# Patient Record
Sex: FEMALE | Race: WHITE | NOT HISPANIC OR LATINO | URBAN - METROPOLITAN AREA
[De-identification: names, ages, dates, MRNs, and addresses within clinical notes are randomized per-mention and may not be internally consistent; named-entity substitution may affect disease eponyms.]

---

## 2017-02-18 ENCOUNTER — OUTPATIENT (OUTPATIENT)
Dept: OUTPATIENT SERVICES | Facility: HOSPITAL | Age: 77
LOS: 1 days | End: 2017-02-18
Payer: MEDICARE

## 2017-02-18 ENCOUNTER — APPOINTMENT (OUTPATIENT)
Dept: MRI IMAGING | Facility: IMAGING CENTER | Age: 77
End: 2017-02-18

## 2017-02-18 DIAGNOSIS — Z00.8 ENCOUNTER FOR OTHER GENERAL EXAMINATION: ICD-10-CM

## 2017-02-18 PROCEDURE — 82565 ASSAY OF CREATININE: CPT

## 2017-02-18 PROCEDURE — A9585: CPT

## 2017-02-18 PROCEDURE — 72197 MRI PELVIS W/O & W/DYE: CPT

## 2017-02-18 PROCEDURE — 74183 MRI ABD W/O CNTR FLWD CNTR: CPT

## 2017-06-21 ENCOUNTER — FORM ENCOUNTER (OUTPATIENT)
Age: 77
End: 2017-06-21

## 2017-06-22 ENCOUNTER — APPOINTMENT (OUTPATIENT)
Dept: RHEUMATOLOGY | Facility: CLINIC | Age: 77
End: 2017-06-22

## 2017-06-22 ENCOUNTER — APPOINTMENT (OUTPATIENT)
Dept: RHEUMATOLOGY | Facility: CLINIC | Age: 77
End: 2017-06-22
Payer: MEDICARE

## 2017-06-22 VITALS
SYSTOLIC BLOOD PRESSURE: 114 MMHG | HEART RATE: 79 BPM | BODY MASS INDEX: 33.39 KG/M2 | DIASTOLIC BLOOD PRESSURE: 79 MMHG | TEMPERATURE: 98 F | WEIGHT: 198 LBS | OXYGEN SATURATION: 97 % | HEIGHT: 64.5 IN

## 2017-06-22 DIAGNOSIS — I48.91 UNSPECIFIED ATRIAL FIBRILLATION: ICD-10-CM

## 2017-06-22 PROCEDURE — 73130 X-RAY EXAM OF HAND: CPT | Mod: LT

## 2017-06-22 RX ORDER — DIAZEPAM 5 MG/1
5 TABLET ORAL
Refills: 0 | Status: ACTIVE | COMMUNITY

## 2017-06-22 RX ORDER — APIXABAN 5 MG/1
5 TABLET, FILM COATED ORAL
Refills: 0 | Status: ACTIVE | COMMUNITY

## 2017-06-22 RX ORDER — BUTALBITAL, ACETAMINOPHEN AND CAFFEINE 50; 325; 40 MG/1; MG/1; MG/1
50-325-40 CAPSULE ORAL
Refills: 0 | Status: ACTIVE | COMMUNITY

## 2017-06-23 LAB
25(OH)D3 SERPL-MCNC: 32 NG/ML
ALBUMIN SERPL ELPH-MCNC: 4 G/DL
ALP BLD-CCNC: 69 U/L
ALT SERPL-CCNC: 8 U/L
ANION GAP SERPL CALC-SCNC: 18 MMOL/L
AST SERPL-CCNC: 15 U/L
BASOPHILS # BLD AUTO: 0.02 K/UL
BASOPHILS NFR BLD AUTO: 0.2 %
BILIRUB SERPL-MCNC: 0.3 MG/DL
BUN SERPL-MCNC: 13 MG/DL
CALCIUM SERPL-MCNC: 9.5 MG/DL
CHLORIDE SERPL-SCNC: 99 MMOL/L
CO2 SERPL-SCNC: 26 MMOL/L
CREAT SERPL-MCNC: 0.77 MG/DL
CRP SERPL-MCNC: 1.7 MG/DL
EOSINOPHIL # BLD AUTO: 0.13 K/UL
EOSINOPHIL NFR BLD AUTO: 1.4 %
ERYTHROCYTE [SEDIMENTATION RATE] IN BLOOD BY WESTERGREN METHOD: 25 MM/HR
GLUCOSE SERPL-MCNC: 82 MG/DL
HCT VFR BLD CALC: 42.3 %
HGB BLD-MCNC: 14.1 G/DL
IMM GRANULOCYTES NFR BLD AUTO: 0.2 %
LYMPHOCYTES # BLD AUTO: 3.21 K/UL
LYMPHOCYTES NFR BLD AUTO: 35.2 %
MAN DIFF?: NORMAL
MCHC RBC-ENTMCNC: 29.4 PG
MCHC RBC-ENTMCNC: 33.3 GM/DL
MCV RBC AUTO: 88.1 FL
MONOCYTES # BLD AUTO: 0.6 K/UL
MONOCYTES NFR BLD AUTO: 6.6 %
NEUTROPHILS # BLD AUTO: 5.15 K/UL
NEUTROPHILS NFR BLD AUTO: 56.4 %
PLATELET # BLD AUTO: 266 K/UL
POTASSIUM SERPL-SCNC: 4.3 MMOL/L
PROT SERPL-MCNC: 7 G/DL
RBC # BLD: 4.8 M/UL
RBC # FLD: 14.1 %
SODIUM SERPL-SCNC: 143 MMOL/L
WBC # FLD AUTO: 9.13 K/UL

## 2017-06-26 LAB — G6PD SER-CCNC: 9.4 U/G HB

## 2017-08-09 ENCOUNTER — FORM ENCOUNTER (OUTPATIENT)
Age: 77
End: 2017-08-09

## 2017-08-10 ENCOUNTER — OUTPATIENT (OUTPATIENT)
Dept: OUTPATIENT SERVICES | Facility: HOSPITAL | Age: 77
LOS: 1 days | End: 2017-08-10
Payer: MEDICARE

## 2017-08-10 ENCOUNTER — APPOINTMENT (OUTPATIENT)
Dept: RHEUMATOLOGY | Facility: CLINIC | Age: 77
End: 2017-08-10
Payer: MEDICARE

## 2017-08-10 ENCOUNTER — APPOINTMENT (OUTPATIENT)
Dept: ULTRASOUND IMAGING | Facility: CLINIC | Age: 77
End: 2017-08-10
Payer: MEDICARE

## 2017-08-10 ENCOUNTER — APPOINTMENT (OUTPATIENT)
Dept: RADIOLOGY | Facility: CLINIC | Age: 77
End: 2017-08-10
Payer: MEDICARE

## 2017-08-10 VITALS
HEART RATE: 101 BPM | WEIGHT: 197 LBS | DIASTOLIC BLOOD PRESSURE: 77 MMHG | SYSTOLIC BLOOD PRESSURE: 117 MMHG | TEMPERATURE: 98.7 F | HEIGHT: 64.5 IN | OXYGEN SATURATION: 97 % | BODY MASS INDEX: 33.22 KG/M2

## 2017-08-10 DIAGNOSIS — M79.671 PAIN IN RIGHT FOOT: ICD-10-CM

## 2017-08-10 DIAGNOSIS — Z48.89 ENCOUNTER FOR OTHER SPECIFIED SURGICAL AFTERCARE: ICD-10-CM

## 2017-08-10 PROCEDURE — 73630 X-RAY EXAM OF FOOT: CPT | Mod: 26,RT

## 2017-08-10 PROCEDURE — 73610 X-RAY EXAM OF ANKLE: CPT

## 2017-08-10 PROCEDURE — 99214 OFFICE O/P EST MOD 30 MIN: CPT

## 2017-08-10 PROCEDURE — 73610 X-RAY EXAM OF ANKLE: CPT | Mod: 26,RT

## 2017-08-10 PROCEDURE — 93970 EXTREMITY STUDY: CPT

## 2017-08-10 PROCEDURE — 73630 X-RAY EXAM OF FOOT: CPT

## 2017-08-10 PROCEDURE — 93970 EXTREMITY STUDY: CPT | Mod: 26

## 2017-08-14 LAB
25(OH)D3 SERPL-MCNC: 33.5 NG/ML
ALBUMIN SERPL ELPH-MCNC: 4 G/DL
ALP BLD-CCNC: 82 U/L
ALT SERPL-CCNC: 12 U/L
ANION GAP SERPL CALC-SCNC: 17 MMOL/L
AST SERPL-CCNC: 14 U/L
BASOPHILS # BLD AUTO: 0.03 K/UL
BASOPHILS NFR BLD AUTO: 0.3 %
BILIRUB SERPL-MCNC: 0.6 MG/DL
BUN SERPL-MCNC: 12 MG/DL
CALCIUM SERPL-MCNC: 9.4 MG/DL
CHLORIDE SERPL-SCNC: 100 MMOL/L
CO2 SERPL-SCNC: 24 MMOL/L
CREAT SERPL-MCNC: 0.79 MG/DL
CRP SERPL-MCNC: 4.9 MG/DL
EOSINOPHIL # BLD AUTO: 0.13 K/UL
EOSINOPHIL NFR BLD AUTO: 1.2 %
ERYTHROCYTE [SEDIMENTATION RATE] IN BLOOD BY WESTERGREN METHOD: 23 MM/HR
GLUCOSE SERPL-MCNC: 105 MG/DL
HCT VFR BLD CALC: 43.3 %
HGB BLD-MCNC: 14.2 G/DL
IMM GRANULOCYTES NFR BLD AUTO: 0.3 %
LYMPHOCYTES # BLD AUTO: 2.93 K/UL
LYMPHOCYTES NFR BLD AUTO: 27.6 %
MAGNESIUM SERPL-MCNC: 1.9 MG/DL
MAN DIFF?: NORMAL
MCHC RBC-ENTMCNC: 29.1 PG
MCHC RBC-ENTMCNC: 32.8 GM/DL
MCV RBC AUTO: 88.7 FL
MONOCYTES # BLD AUTO: 0.84 K/UL
MONOCYTES NFR BLD AUTO: 7.9 %
NEUTROPHILS # BLD AUTO: 6.66 K/UL
NEUTROPHILS NFR BLD AUTO: 62.7 %
PHOSPHATE SERPL-MCNC: 3.5 MG/DL
PLATELET # BLD AUTO: 273 K/UL
POTASSIUM SERPL-SCNC: 3.9 MMOL/L
PROT SERPL-MCNC: 6.9 G/DL
RBC # BLD: 4.88 M/UL
RBC # FLD: 14.2 %
SODIUM SERPL-SCNC: 141 MMOL/L
URATE SERPL-MCNC: 8 MG/DL
WBC # FLD AUTO: 10.62 K/UL

## 2017-10-19 ENCOUNTER — APPOINTMENT (OUTPATIENT)
Dept: RHEUMATOLOGY | Facility: CLINIC | Age: 77
End: 2017-10-19
Payer: MEDICARE

## 2017-10-19 VITALS
DIASTOLIC BLOOD PRESSURE: 84 MMHG | HEART RATE: 85 BPM | HEIGHT: 64.5 IN | SYSTOLIC BLOOD PRESSURE: 127 MMHG | WEIGHT: 195 LBS | TEMPERATURE: 98.5 F | OXYGEN SATURATION: 98 % | BODY MASS INDEX: 32.89 KG/M2

## 2017-10-19 DIAGNOSIS — K42.9 UMBILICAL HERNIA W/OUT OBSTRUCTION OR GANGRENE: ICD-10-CM

## 2017-10-19 DIAGNOSIS — M79.671 PAIN IN RIGHT FOOT: ICD-10-CM

## 2017-10-19 DIAGNOSIS — Z09 ENCOUNTER FOR FOLLOW-UP EXAMINATION AFTER COMPLETED TREATMENT FOR CONDITIONS OTHER THAN MALIGNANT NEOPLASM: ICD-10-CM

## 2017-10-19 PROCEDURE — 99215 OFFICE O/P EST HI 40 MIN: CPT

## 2017-10-20 LAB
25(OH)D3 SERPL-MCNC: 36.5 NG/ML
ALBUMIN SERPL ELPH-MCNC: 3.8 G/DL
ALP BLD-CCNC: 76 U/L
ALT SERPL-CCNC: 14 U/L
ANION GAP SERPL CALC-SCNC: 13 MMOL/L
AST SERPL-CCNC: 18 U/L
BASOPHILS # BLD AUTO: 0.02 K/UL
BASOPHILS NFR BLD AUTO: 0.2 %
BILIRUB SERPL-MCNC: 0.4 MG/DL
BUN SERPL-MCNC: 13 MG/DL
CALCIUM SERPL-MCNC: 9.9 MG/DL
CHLORIDE SERPL-SCNC: 102 MMOL/L
CO2 SERPL-SCNC: 27 MMOL/L
CREAT SERPL-MCNC: 0.85 MG/DL
CRP SERPL-MCNC: 1.9 MG/DL
EOSINOPHIL # BLD AUTO: 0.08 K/UL
EOSINOPHIL NFR BLD AUTO: 0.9 %
ERYTHROCYTE [SEDIMENTATION RATE] IN BLOOD BY WESTERGREN METHOD: 28 MM/HR
GLUCOSE SERPL-MCNC: 92 MG/DL
HCT VFR BLD CALC: 42.1 %
HGB BLD-MCNC: 13.7 G/DL
IMM GRANULOCYTES NFR BLD AUTO: 0.2 %
LYMPHOCYTES # BLD AUTO: 2.81 K/UL
LYMPHOCYTES NFR BLD AUTO: 30.5 %
MAN DIFF?: NORMAL
MCHC RBC-ENTMCNC: 29 PG
MCHC RBC-ENTMCNC: 32.5 GM/DL
MCV RBC AUTO: 89 FL
MONOCYTES # BLD AUTO: 0.71 K/UL
MONOCYTES NFR BLD AUTO: 7.7 %
NEUTROPHILS # BLD AUTO: 5.58 K/UL
NEUTROPHILS NFR BLD AUTO: 60.5 %
PLATELET # BLD AUTO: 258 K/UL
POTASSIUM SERPL-SCNC: 4 MMOL/L
PROT SERPL-MCNC: 6.7 G/DL
RBC # BLD: 4.73 M/UL
RBC # FLD: 13.9 %
SODIUM SERPL-SCNC: 142 MMOL/L
TSH SERPL-ACNC: 1.35 UIU/ML
URATE SERPL-MCNC: 8 MG/DL
WBC # FLD AUTO: 9.22 K/UL

## 2018-01-22 ENCOUNTER — FORM ENCOUNTER (OUTPATIENT)
Age: 78
End: 2018-01-22

## 2018-01-23 ENCOUNTER — APPOINTMENT (OUTPATIENT)
Dept: MAMMOGRAPHY | Facility: IMAGING CENTER | Age: 78
End: 2018-01-23
Payer: MEDICARE

## 2018-01-23 ENCOUNTER — OUTPATIENT (OUTPATIENT)
Dept: OUTPATIENT SERVICES | Facility: HOSPITAL | Age: 78
LOS: 1 days | End: 2018-01-23
Payer: MEDICARE

## 2018-01-23 ENCOUNTER — APPOINTMENT (OUTPATIENT)
Dept: ULTRASOUND IMAGING | Facility: IMAGING CENTER | Age: 78
End: 2018-01-23
Payer: MEDICARE

## 2018-01-23 DIAGNOSIS — Z00.8 ENCOUNTER FOR OTHER GENERAL EXAMINATION: ICD-10-CM

## 2018-01-23 PROCEDURE — 76641 ULTRASOUND BREAST COMPLETE: CPT | Mod: 26,50

## 2018-01-23 PROCEDURE — 77067 SCR MAMMO BI INCL CAD: CPT

## 2018-01-23 PROCEDURE — 77063 BREAST TOMOSYNTHESIS BI: CPT | Mod: 26

## 2018-01-23 PROCEDURE — 77067 SCR MAMMO BI INCL CAD: CPT | Mod: 26

## 2018-01-23 PROCEDURE — 77063 BREAST TOMOSYNTHESIS BI: CPT

## 2018-01-23 PROCEDURE — 76641 ULTRASOUND BREAST COMPLETE: CPT

## 2018-02-08 ENCOUNTER — APPOINTMENT (OUTPATIENT)
Dept: RHEUMATOLOGY | Facility: CLINIC | Age: 78
End: 2018-02-08
Payer: MEDICARE

## 2018-02-08 VITALS
BODY MASS INDEX: 32.38 KG/M2 | WEIGHT: 192 LBS | HEIGHT: 64.5 IN | OXYGEN SATURATION: 97 % | SYSTOLIC BLOOD PRESSURE: 131 MMHG | HEART RATE: 75 BPM | DIASTOLIC BLOOD PRESSURE: 81 MMHG | TEMPERATURE: 98.2 F

## 2018-02-08 DIAGNOSIS — M19.90 UNSPECIFIED OSTEOARTHRITIS, UNSPECIFIED SITE: ICD-10-CM

## 2018-02-08 DIAGNOSIS — M15.9 POLYOSTEOARTHRITIS, UNSPECIFIED: ICD-10-CM

## 2018-02-08 DIAGNOSIS — M76.821 POSTERIOR TIBIAL TENDINITIS, RIGHT LEG: ICD-10-CM

## 2018-02-08 PROCEDURE — 99214 OFFICE O/P EST MOD 30 MIN: CPT

## 2018-02-09 PROBLEM — M76.821 POSTERIOR TIBIAL TENDINITIS OF RIGHT LOWER EXTREMITY: Status: ACTIVE | Noted: 2017-10-19

## 2018-02-09 PROBLEM — M15.9 GENERALIZED OSTEOARTHRITIS OF MULTIPLE SITES: Status: ACTIVE | Noted: 2017-10-19

## 2019-01-25 ENCOUNTER — APPOINTMENT (OUTPATIENT)
Dept: ULTRASOUND IMAGING | Facility: IMAGING CENTER | Age: 79
End: 2019-01-25
Payer: MEDICARE

## 2019-01-25 ENCOUNTER — OUTPATIENT (OUTPATIENT)
Dept: OUTPATIENT SERVICES | Facility: HOSPITAL | Age: 79
LOS: 1 days | End: 2019-01-25
Payer: MEDICARE

## 2019-01-25 ENCOUNTER — APPOINTMENT (OUTPATIENT)
Dept: RADIOLOGY | Facility: IMAGING CENTER | Age: 79
End: 2019-01-25
Payer: MEDICARE

## 2019-01-25 ENCOUNTER — APPOINTMENT (OUTPATIENT)
Dept: MAMMOGRAPHY | Facility: IMAGING CENTER | Age: 79
End: 2019-01-25
Payer: MEDICARE

## 2019-01-25 DIAGNOSIS — Z00.8 ENCOUNTER FOR OTHER GENERAL EXAMINATION: ICD-10-CM

## 2019-01-25 PROCEDURE — 77080 DXA BONE DENSITY AXIAL: CPT | Mod: 26

## 2019-01-25 PROCEDURE — 76641 ULTRASOUND BREAST COMPLETE: CPT

## 2019-01-25 PROCEDURE — 77067 SCR MAMMO BI INCL CAD: CPT | Mod: 26

## 2019-01-25 PROCEDURE — 76641 ULTRASOUND BREAST COMPLETE: CPT | Mod: 26,50

## 2019-01-25 PROCEDURE — 77063 BREAST TOMOSYNTHESIS BI: CPT | Mod: 26

## 2019-01-25 PROCEDURE — 77063 BREAST TOMOSYNTHESIS BI: CPT

## 2019-01-25 PROCEDURE — 77067 SCR MAMMO BI INCL CAD: CPT

## 2019-01-25 PROCEDURE — 77080 DXA BONE DENSITY AXIAL: CPT

## 2019-02-08 ENCOUNTER — OUTPATIENT (OUTPATIENT)
Dept: OUTPATIENT SERVICES | Facility: HOSPITAL | Age: 79
LOS: 1 days | End: 2019-02-08
Payer: MEDICARE

## 2019-02-08 ENCOUNTER — APPOINTMENT (OUTPATIENT)
Dept: ULTRASOUND IMAGING | Facility: IMAGING CENTER | Age: 79
End: 2019-02-08
Payer: MEDICARE

## 2019-02-08 ENCOUNTER — RESULT REVIEW (OUTPATIENT)
Age: 79
End: 2019-02-08

## 2019-02-08 DIAGNOSIS — C50.911 MALIGNANT NEOPLASM OF UNSPECIFIED SITE OF RIGHT FEMALE BREAST: ICD-10-CM

## 2019-02-08 PROCEDURE — 77065 DX MAMMO INCL CAD UNI: CPT

## 2019-02-08 PROCEDURE — 88305 TISSUE EXAM BY PATHOLOGIST: CPT

## 2019-02-08 PROCEDURE — 19083 BX BREAST 1ST LESION US IMAG: CPT

## 2019-02-08 PROCEDURE — 77065 DX MAMMO INCL CAD UNI: CPT | Mod: 26,RT

## 2019-02-08 PROCEDURE — 19083 BX BREAST 1ST LESION US IMAG: CPT | Mod: RT

## 2019-02-08 PROCEDURE — A4648: CPT

## 2019-02-08 PROCEDURE — 88305 TISSUE EXAM BY PATHOLOGIST: CPT | Mod: 26

## 2019-11-08 ENCOUNTER — APPOINTMENT (OUTPATIENT)
Dept: CT IMAGING | Facility: IMAGING CENTER | Age: 79
End: 2019-11-08
Payer: MEDICARE

## 2019-11-08 ENCOUNTER — OUTPATIENT (OUTPATIENT)
Dept: OUTPATIENT SERVICES | Facility: HOSPITAL | Age: 79
LOS: 1 days | End: 2019-11-08
Payer: MEDICARE

## 2019-11-08 DIAGNOSIS — Z00.8 ENCOUNTER FOR OTHER GENERAL EXAMINATION: ICD-10-CM

## 2019-11-08 PROCEDURE — 82565 ASSAY OF CREATININE: CPT

## 2019-11-08 PROCEDURE — 74178 CT ABD&PLV WO CNTR FLWD CNTR: CPT

## 2019-11-08 PROCEDURE — 74178 CT ABD&PLV WO CNTR FLWD CNTR: CPT | Mod: 26

## 2020-01-29 ENCOUNTER — APPOINTMENT (OUTPATIENT)
Dept: MAMMOGRAPHY | Facility: IMAGING CENTER | Age: 80
End: 2020-01-29
Payer: MEDICARE

## 2020-01-29 ENCOUNTER — APPOINTMENT (OUTPATIENT)
Dept: ULTRASOUND IMAGING | Facility: IMAGING CENTER | Age: 80
End: 2020-01-29
Payer: MEDICARE

## 2020-01-29 ENCOUNTER — OUTPATIENT (OUTPATIENT)
Dept: OUTPATIENT SERVICES | Facility: HOSPITAL | Age: 80
LOS: 1 days | End: 2020-01-29
Payer: MEDICARE

## 2020-01-29 DIAGNOSIS — Z00.8 ENCOUNTER FOR OTHER GENERAL EXAMINATION: ICD-10-CM

## 2020-01-29 PROCEDURE — 76641 ULTRASOUND BREAST COMPLETE: CPT | Mod: 26,50

## 2020-01-29 PROCEDURE — 77066 DX MAMMO INCL CAD BI: CPT | Mod: 26

## 2020-01-29 PROCEDURE — G0279: CPT | Mod: 26

## 2020-01-29 PROCEDURE — 76641 ULTRASOUND BREAST COMPLETE: CPT

## 2020-01-29 PROCEDURE — G0279: CPT

## 2020-01-29 PROCEDURE — 77066 DX MAMMO INCL CAD BI: CPT

## 2020-03-23 ENCOUNTER — APPOINTMENT (OUTPATIENT)
Dept: PHYSICAL MEDICINE AND REHAB | Facility: CLINIC | Age: 80
End: 2020-03-23

## 2020-06-15 ENCOUNTER — APPOINTMENT (OUTPATIENT)
Dept: PHYSICAL MEDICINE AND REHAB | Facility: CLINIC | Age: 80
End: 2020-06-15
Payer: MEDICARE

## 2020-06-15 VITALS
BODY MASS INDEX: 31.58 KG/M2 | SYSTOLIC BLOOD PRESSURE: 120 MMHG | TEMPERATURE: 96.8 F | HEIGHT: 64 IN | OXYGEN SATURATION: 92 % | DIASTOLIC BLOOD PRESSURE: 82 MMHG | HEART RATE: 73 BPM | WEIGHT: 185 LBS

## 2020-06-15 DIAGNOSIS — M19.012 PRIMARY OSTEOARTHRITIS, LEFT SHOULDER: ICD-10-CM

## 2020-06-15 PROCEDURE — 99203 OFFICE O/P NEW LOW 30 MIN: CPT

## 2020-06-15 RX ORDER — DICLOFENAC SODIUM 16.05 MG/ML
1.5 SOLUTION TOPICAL
Qty: 1 | Refills: 0 | Status: ACTIVE | COMMUNITY
Start: 2020-06-15 | End: 1900-01-01

## 2020-06-17 PROBLEM — M19.012 ARTHRITIS OF LEFT SHOULDER REGION: Status: ACTIVE | Noted: 2020-06-15

## 2020-06-17 NOTE — PHYSICAL EXAM
[Normal] : Deep tendon reflexes were 2+ and symmetric, the sensory exam was normal to light touch and pinprick and no focal deficits [de-identified] : decreased ROm bilateral shoulders to 90 degrees.  [de-identified] : + edema LUE

## 2020-06-17 NOTE — HISTORY OF PRESENT ILLNESS
[FreeTextEntry1] : 81 yo female presents to establish care for lymphedema \par The patient has a history of bilateral breast cancer. She had a right breast cancer in 7/2000 (6 mm ductal, SLN neg, ER+) treated with lumpectomy, SLN biopsy, radiation and tamoxifen. She then had a left breast cancer in 5/2004 (3 mm lobular, 1/23+ nodes, ER 90% VT 80% Her 2-0) treated with lumpectomy, axillary dissection, radiation and Arimidex.\par She has LUE lymphedema for which she had lymphedema therapy, uses a compression sleeve and glove and her swelling is stable,.\par She has OA and very limited use of bilateral shoulders.\par She would like to go to PT but her PT has concerns for lymphedema.

## 2020-06-17 NOTE — ASSESSMENT
[FreeTextEntry1] : Patient with severe OA and lymphedema\par Discussed no contraidication to PT, just no US. \par continue compression sleeve/glove\par F/u in 3 m

## 2020-08-27 ENCOUNTER — APPOINTMENT (OUTPATIENT)
Dept: CT IMAGING | Facility: IMAGING CENTER | Age: 80
End: 2020-08-27
Payer: MEDICARE

## 2020-08-27 ENCOUNTER — OUTPATIENT (OUTPATIENT)
Dept: OUTPATIENT SERVICES | Facility: HOSPITAL | Age: 80
LOS: 1 days | End: 2020-08-27
Payer: MEDICARE

## 2020-08-27 ENCOUNTER — APPOINTMENT (OUTPATIENT)
Dept: ULTRASOUND IMAGING | Facility: IMAGING CENTER | Age: 80
End: 2020-08-27
Payer: MEDICARE

## 2020-08-27 DIAGNOSIS — C50.919 MALIGNANT NEOPLASM OF UNSPECIFIED SITE OF UNSPECIFIED FEMALE BREAST: ICD-10-CM

## 2020-08-27 DIAGNOSIS — Z00.8 ENCOUNTER FOR OTHER GENERAL EXAMINATION: ICD-10-CM

## 2020-08-27 PROCEDURE — 74178 CT ABD&PLV WO CNTR FLWD CNTR: CPT

## 2020-08-27 PROCEDURE — 74178 CT ABD&PLV WO CNTR FLWD CNTR: CPT | Mod: 26

## 2020-08-27 PROCEDURE — 76882 US LMTD JT/FCL EVL NVASC XTR: CPT

## 2020-08-27 PROCEDURE — 76882 US LMTD JT/FCL EVL NVASC XTR: CPT | Mod: 26,LT

## 2020-08-27 PROCEDURE — 82565 ASSAY OF CREATININE: CPT

## 2021-01-14 ENCOUNTER — APPOINTMENT (OUTPATIENT)
Dept: CT IMAGING | Facility: IMAGING CENTER | Age: 81
End: 2021-01-14
Payer: MEDICARE

## 2021-01-14 ENCOUNTER — OUTPATIENT (OUTPATIENT)
Dept: OUTPATIENT SERVICES | Facility: HOSPITAL | Age: 81
LOS: 1 days | End: 2021-01-14
Payer: MEDICARE

## 2021-01-14 DIAGNOSIS — Z00.8 ENCOUNTER FOR OTHER GENERAL EXAMINATION: ICD-10-CM

## 2021-01-14 DIAGNOSIS — R31.0 GROSS HEMATURIA: ICD-10-CM

## 2021-01-14 PROCEDURE — 74178 CT ABD&PLV WO CNTR FLWD CNTR: CPT

## 2021-01-14 PROCEDURE — 74178 CT ABD&PLV WO CNTR FLWD CNTR: CPT | Mod: 26

## 2021-01-14 PROCEDURE — 82565 ASSAY OF CREATININE: CPT

## 2021-03-15 ENCOUNTER — APPOINTMENT (OUTPATIENT)
Dept: MAMMOGRAPHY | Facility: IMAGING CENTER | Age: 81
End: 2021-03-15
Payer: MEDICARE

## 2021-03-15 ENCOUNTER — OUTPATIENT (OUTPATIENT)
Dept: OUTPATIENT SERVICES | Facility: HOSPITAL | Age: 81
LOS: 1 days | End: 2021-03-15
Payer: MEDICARE

## 2021-03-15 ENCOUNTER — APPOINTMENT (OUTPATIENT)
Dept: ULTRASOUND IMAGING | Facility: IMAGING CENTER | Age: 81
End: 2021-03-15

## 2021-03-15 DIAGNOSIS — Z00.8 ENCOUNTER FOR OTHER GENERAL EXAMINATION: ICD-10-CM

## 2021-03-15 PROCEDURE — 77063 BREAST TOMOSYNTHESIS BI: CPT | Mod: 26

## 2021-03-15 PROCEDURE — 76641 ULTRASOUND BREAST COMPLETE: CPT

## 2021-03-15 PROCEDURE — 77067 SCR MAMMO BI INCL CAD: CPT | Mod: 26

## 2021-03-15 PROCEDURE — 76641 ULTRASOUND BREAST COMPLETE: CPT | Mod: 26,50

## 2021-03-15 PROCEDURE — 77067 SCR MAMMO BI INCL CAD: CPT

## 2021-03-15 PROCEDURE — 77063 BREAST TOMOSYNTHESIS BI: CPT

## 2022-04-27 ENCOUNTER — OUTPATIENT (OUTPATIENT)
Dept: OUTPATIENT SERVICES | Facility: HOSPITAL | Age: 82
LOS: 1 days | End: 2022-04-27
Payer: MEDICARE

## 2022-04-27 ENCOUNTER — APPOINTMENT (OUTPATIENT)
Dept: MAMMOGRAPHY | Facility: IMAGING CENTER | Age: 82
End: 2022-04-27

## 2022-04-27 ENCOUNTER — APPOINTMENT (OUTPATIENT)
Dept: ULTRASOUND IMAGING | Facility: IMAGING CENTER | Age: 82
End: 2022-04-27

## 2022-04-27 DIAGNOSIS — Z00.8 ENCOUNTER FOR OTHER GENERAL EXAMINATION: ICD-10-CM

## 2022-04-27 PROCEDURE — 77063 BREAST TOMOSYNTHESIS BI: CPT

## 2022-04-27 PROCEDURE — 77067 SCR MAMMO BI INCL CAD: CPT

## 2022-04-27 PROCEDURE — 77063 BREAST TOMOSYNTHESIS BI: CPT | Mod: 26

## 2022-04-27 PROCEDURE — 76641 ULTRASOUND BREAST COMPLETE: CPT | Mod: 26,50

## 2022-04-27 PROCEDURE — 77067 SCR MAMMO BI INCL CAD: CPT | Mod: 26

## 2022-04-27 PROCEDURE — 76641 ULTRASOUND BREAST COMPLETE: CPT

## 2023-04-12 ENCOUNTER — OUTPATIENT (OUTPATIENT)
Dept: OUTPATIENT SERVICES | Facility: HOSPITAL | Age: 83
LOS: 1 days | End: 2023-04-12
Payer: MEDICARE

## 2023-04-12 ENCOUNTER — APPOINTMENT (OUTPATIENT)
Dept: CT IMAGING | Facility: IMAGING CENTER | Age: 83
End: 2023-04-12
Payer: MEDICARE

## 2023-04-12 DIAGNOSIS — Z00.8 ENCOUNTER FOR OTHER GENERAL EXAMINATION: ICD-10-CM

## 2023-04-12 PROCEDURE — 74178 CT ABD&PLV WO CNTR FLWD CNTR: CPT | Mod: 26

## 2023-04-12 PROCEDURE — 74178 CT ABD&PLV WO CNTR FLWD CNTR: CPT

## 2023-04-14 ENCOUNTER — TRANSCRIPTION ENCOUNTER (OUTPATIENT)
Age: 83
End: 2023-04-14

## 2023-06-12 ENCOUNTER — APPOINTMENT (OUTPATIENT)
Dept: ULTRASOUND IMAGING | Facility: IMAGING CENTER | Age: 83
End: 2023-06-12
Payer: MEDICARE

## 2023-06-12 ENCOUNTER — APPOINTMENT (OUTPATIENT)
Dept: MAMMOGRAPHY | Facility: IMAGING CENTER | Age: 83
End: 2023-06-12
Payer: MEDICARE

## 2023-06-12 ENCOUNTER — OUTPATIENT (OUTPATIENT)
Dept: OUTPATIENT SERVICES | Facility: HOSPITAL | Age: 83
LOS: 1 days | End: 2023-06-12
Payer: MEDICARE

## 2023-06-12 DIAGNOSIS — Z00.8 ENCOUNTER FOR OTHER GENERAL EXAMINATION: ICD-10-CM

## 2023-06-12 PROCEDURE — 77063 BREAST TOMOSYNTHESIS BI: CPT | Mod: 26

## 2023-06-12 PROCEDURE — 76641 ULTRASOUND BREAST COMPLETE: CPT | Mod: 26,50

## 2023-06-12 PROCEDURE — 77067 SCR MAMMO BI INCL CAD: CPT | Mod: 26

## 2023-06-12 PROCEDURE — 76641 ULTRASOUND BREAST COMPLETE: CPT

## 2023-06-12 PROCEDURE — 77063 BREAST TOMOSYNTHESIS BI: CPT

## 2023-06-12 PROCEDURE — 77067 SCR MAMMO BI INCL CAD: CPT

## 2023-11-16 ENCOUNTER — OUTPATIENT (OUTPATIENT)
Dept: OUTPATIENT SERVICES | Facility: HOSPITAL | Age: 83
LOS: 1 days | End: 2023-11-16
Payer: MEDICARE

## 2023-11-16 ENCOUNTER — APPOINTMENT (OUTPATIENT)
Dept: CT IMAGING | Facility: IMAGING CENTER | Age: 83
End: 2023-11-16
Payer: MEDICARE

## 2023-11-16 DIAGNOSIS — Z00.8 ENCOUNTER FOR OTHER GENERAL EXAMINATION: ICD-10-CM

## 2023-11-16 PROCEDURE — 74178 CT ABD&PLV WO CNTR FLWD CNTR: CPT

## 2023-11-16 PROCEDURE — 74178 CT ABD&PLV WO CNTR FLWD CNTR: CPT | Mod: 26

## 2024-01-28 ENCOUNTER — EMERGENCY (EMERGENCY)
Facility: HOSPITAL | Age: 84
LOS: 1 days | Discharge: HOME | End: 2024-01-28
Attending: EMERGENCY MEDICINE
Payer: MEDICARE

## 2024-01-28 VITALS
OXYGEN SATURATION: 94 % | HEIGHT: 62 IN | RESPIRATION RATE: 18 BRPM | SYSTOLIC BLOOD PRESSURE: 117 MMHG | DIASTOLIC BLOOD PRESSURE: 77 MMHG | TEMPERATURE: 98 F | WEIGHT: 149.91 LBS | HEART RATE: 76 BPM

## 2024-01-28 VITALS
SYSTOLIC BLOOD PRESSURE: 113 MMHG | DIASTOLIC BLOOD PRESSURE: 68 MMHG | HEART RATE: 69 BPM | TEMPERATURE: 98 F | OXYGEN SATURATION: 95 % | RESPIRATION RATE: 18 BRPM

## 2024-01-28 PROCEDURE — 70450 CT HEAD/BRAIN W/O DYE: CPT | Mod: MA

## 2024-01-28 PROCEDURE — 99284 EMERGENCY DEPT VISIT MOD MDM: CPT | Mod: 25

## 2024-01-28 PROCEDURE — 12001 RPR S/N/AX/GEN/TRNK 2.5CM/<: CPT

## 2024-01-28 PROCEDURE — 70450 CT HEAD/BRAIN W/O DYE: CPT | Mod: 26,MA

## 2024-01-28 NOTE — ED ADULT NURSE NOTE - NSFALLHARMRISKINTERV_ED_ALL_ED

## 2024-01-28 NOTE — ED ADULT NURSE NOTE - CAS TRG GEN SKIN COLOR
Ilumya Pregnancy And Lactation Text: The risk during pregnancy and breastfeeding is uncertain with this medication. Normal for race

## 2024-01-28 NOTE — ED PROVIDER NOTE - NSFOLLOWUPINSTRUCTIONS_ED_ALL_ED_FT
Today you were seen in the ED for a fall     Please return to the emergency department or your primary care provider's office or urgent care to the staples removed.  You had 3 staples placed today.    It was found that you have No signs of medical emergency warranting further evaluation in the emergency department.    A copy of your results attached this document below.    Please follow up with Your primary care provider in regards to today's event.    Please return to the ED if you have any of the following:     A laceration is a cut that goes through all of the layers of the skin and into the tissue that is right under the skin. Some lacerations heal on their own. Others need to be closed with skin adhesive strips, skin glue, stitches (sutures), or staples. Proper laceration care minimizes the risk of infection and helps the laceration to heal better.  If non-absorbable stitches or staples have been placed, they must be taken out within the time frame instructed by your healthcare provider.    SEEK IMMEDIATE MEDICAL CARE IF YOU HAVE ANY OF THE FOLLOWING SYMPTOMS: swelling around the wound, worsening pain, drainage from the wound, red streaking going away from your wound, inability to move finger or toe near the laceration, or discoloration of skin near the laceration.

## 2024-01-28 NOTE — ED PROVIDER NOTE - PHYSICAL EXAMINATION
GENERAL: Awake, alert, NAD    HEENT No c spine tenderness full rom of neck approx .5x5cm lac to occipital region minimal active bleeding   EXT: No edema, no calf tenderness, no deformities.  NEURO: A&Ox3. Moving all extremities.  SKIN: Warm and dry. No rash.  PSYCH: Normal affect.

## 2024-01-28 NOTE — ED PROVIDER NOTE - PATIENT PORTAL LINK FT
You can access the FollowMyHealth Patient Portal offered by [facility] by registering at the following website: [email or web address]. By joining [facility]’s FollowMyHealth portal, you will also be able to view your health information using other applications (apps) compatible with our system.

## 2024-01-28 NOTE — ED PROVIDER NOTE - ATTENDING CONTRIBUTION TO CARE
84 yo female presents for fall on eliquis.  no LOC.  family @ bedside for collateral.  head lac noted, see procedure noted.  CT head without ICH.  stable for d/c.

## 2024-01-28 NOTE — ED ADULT NURSE NOTE - OBJECTIVE STATEMENT
84 y/o female presents to the ED s/p unwitnessed mechanical fall. A/Ox4. Ambulatory without assistive devices at baseline. PMH: HTN and A-fib (On Eliquis). Patient endorses she was putting on her socks when she slid off a chair with wheels. Patient endorses when she was attempting to get herself up, the chair slid from behind her causing her to strike her head. Patient denies LOC. Patient denies nausea, vomiting, blurry vision, chest pain, dyspnea, fever, cough and chills.  Face is symmetrical. PERRL 4mm (BL cataract surgery). Speech is clear. Patient is moving all extremities with 5/5 strength. Safety and comfort provided.

## 2024-01-28 NOTE — ED PROVIDER NOTE - OBJECTIVE STATEMENT
83-year-old female history of A-fib on Eliqu chief complaint status post fall.  Patient was putting on her socks and fell backward hitting her head no LOC patient members everything.  Patient states she is sure that she is up-to-date with her tetanus vaccine within the past 5 years

## 2024-03-20 ENCOUNTER — NON-APPOINTMENT (OUTPATIENT)
Age: 84
End: 2024-03-20

## 2024-03-23 ENCOUNTER — INPATIENT (INPATIENT)
Facility: HOSPITAL | Age: 84
LOS: 6 days | Discharge: NURSING, REHAB OR LONG-TERM CARE | DRG: 552 | End: 2024-03-30
Attending: INTERNAL MEDICINE | Admitting: INTERNAL MEDICINE
Payer: MEDICARE

## 2024-03-23 VITALS
HEIGHT: 62 IN | SYSTOLIC BLOOD PRESSURE: 110 MMHG | OXYGEN SATURATION: 99 % | RESPIRATION RATE: 20 BRPM | HEART RATE: 68 BPM | DIASTOLIC BLOOD PRESSURE: 73 MMHG | TEMPERATURE: 98 F | WEIGHT: 169.98 LBS

## 2024-03-23 DIAGNOSIS — M54.9 DORSALGIA, UNSPECIFIED: ICD-10-CM

## 2024-03-23 LAB
ADD ON TEST-SPECIMEN IN LAB: SIGNIFICANT CHANGE UP
ALBUMIN SERPL ELPH-MCNC: 3.6 G/DL — SIGNIFICANT CHANGE UP (ref 3.3–5)
ALP SERPL-CCNC: 93 U/L — SIGNIFICANT CHANGE UP (ref 40–120)
ALT FLD-CCNC: 7 U/L — LOW (ref 10–45)
ANION GAP SERPL CALC-SCNC: 18 MMOL/L — HIGH (ref 5–17)
APTT BLD: 39.7 SEC — HIGH (ref 24.5–35.6)
AST SERPL-CCNC: 17 U/L — SIGNIFICANT CHANGE UP (ref 10–40)
BASOPHILS # BLD AUTO: 0.03 K/UL — SIGNIFICANT CHANGE UP (ref 0–0.2)
BASOPHILS NFR BLD AUTO: 0.3 % — SIGNIFICANT CHANGE UP (ref 0–2)
BILIRUB SERPL-MCNC: 0.8 MG/DL — SIGNIFICANT CHANGE UP (ref 0.2–1.2)
BUN SERPL-MCNC: 19 MG/DL — SIGNIFICANT CHANGE UP (ref 7–23)
CALCIUM SERPL-MCNC: 9.7 MG/DL — SIGNIFICANT CHANGE UP (ref 8.4–10.5)
CHLORIDE SERPL-SCNC: 100 MMOL/L — SIGNIFICANT CHANGE UP (ref 96–108)
CO2 SERPL-SCNC: 22 MMOL/L — SIGNIFICANT CHANGE UP (ref 22–31)
CREAT SERPL-MCNC: 0.87 MG/DL — SIGNIFICANT CHANGE UP (ref 0.5–1.3)
EGFR: 66 ML/MIN/1.73M2 — SIGNIFICANT CHANGE UP
EOSINOPHIL # BLD AUTO: 0.04 K/UL — SIGNIFICANT CHANGE UP (ref 0–0.5)
EOSINOPHIL NFR BLD AUTO: 0.4 % — SIGNIFICANT CHANGE UP (ref 0–6)
GLUCOSE SERPL-MCNC: 139 MG/DL — HIGH (ref 70–99)
HCT VFR BLD CALC: 33.1 % — LOW (ref 34.5–45)
HGB BLD-MCNC: 9.8 G/DL — LOW (ref 11.5–15.5)
IMM GRANULOCYTES NFR BLD AUTO: 0.6 % — SIGNIFICANT CHANGE UP (ref 0–0.9)
INR BLD: 2.8 RATIO — HIGH (ref 0.85–1.18)
LYMPHOCYTES # BLD AUTO: 1.51 K/UL — SIGNIFICANT CHANGE UP (ref 1–3.3)
LYMPHOCYTES # BLD AUTO: 13.9 % — SIGNIFICANT CHANGE UP (ref 13–44)
MCHC RBC-ENTMCNC: 23.1 PG — LOW (ref 27–34)
MCHC RBC-ENTMCNC: 29.6 GM/DL — LOW (ref 32–36)
MCV RBC AUTO: 78.1 FL — LOW (ref 80–100)
MONOCYTES # BLD AUTO: 0.65 K/UL — SIGNIFICANT CHANGE UP (ref 0–0.9)
MONOCYTES NFR BLD AUTO: 6 % — SIGNIFICANT CHANGE UP (ref 2–14)
NEUTROPHILS # BLD AUTO: 8.54 K/UL — HIGH (ref 1.8–7.4)
NEUTROPHILS NFR BLD AUTO: 78.8 % — HIGH (ref 43–77)
NRBC # BLD: 0 /100 WBCS — SIGNIFICANT CHANGE UP (ref 0–0)
PLATELET # BLD AUTO: 352 K/UL — SIGNIFICANT CHANGE UP (ref 150–400)
POTASSIUM SERPL-MCNC: 3.4 MMOL/L — LOW (ref 3.5–5.3)
POTASSIUM SERPL-SCNC: 3.4 MMOL/L — LOW (ref 3.5–5.3)
PROT SERPL-MCNC: 7.2 G/DL — SIGNIFICANT CHANGE UP (ref 6–8.3)
PROTHROM AB SERPL-ACNC: 29.9 SEC — HIGH (ref 9.5–13)
RBC # BLD: 4.24 M/UL — SIGNIFICANT CHANGE UP (ref 3.8–5.2)
RBC # FLD: 16.6 % — HIGH (ref 10.3–14.5)
SODIUM SERPL-SCNC: 140 MMOL/L — SIGNIFICANT CHANGE UP (ref 135–145)
TROPONIN T, HIGH SENSITIVITY RESULT: 8 NG/L — SIGNIFICANT CHANGE UP (ref 0–51)
WBC # BLD: 10.84 K/UL — HIGH (ref 3.8–10.5)
WBC # FLD AUTO: 10.84 K/UL — HIGH (ref 3.8–10.5)

## 2024-03-23 PROCEDURE — 73030 X-RAY EXAM OF SHOULDER: CPT | Mod: 26,RT

## 2024-03-23 PROCEDURE — 72125 CT NECK SPINE W/O DYE: CPT | Mod: 26,MC

## 2024-03-23 PROCEDURE — 73564 X-RAY EXAM KNEE 4 OR MORE: CPT | Mod: 26,50

## 2024-03-23 PROCEDURE — 72131 CT LUMBAR SPINE W/O DYE: CPT | Mod: 26,MC

## 2024-03-23 PROCEDURE — 70450 CT HEAD/BRAIN W/O DYE: CPT | Mod: 26,MC

## 2024-03-23 PROCEDURE — 99223 1ST HOSP IP/OBS HIGH 75: CPT

## 2024-03-23 PROCEDURE — 99285 EMERGENCY DEPT VISIT HI MDM: CPT

## 2024-03-23 RX ORDER — ACETAMINOPHEN 500 MG
1000 TABLET ORAL ONCE
Refills: 0 | Status: COMPLETED | OUTPATIENT
Start: 2024-03-23 | End: 2024-03-23

## 2024-03-23 RX ORDER — TETANUS TOXOID, REDUCED DIPHTHERIA TOXOID AND ACELLULAR PERTUSSIS VACCINE, ADSORBED 5; 2.5; 8; 8; 2.5 [IU]/.5ML; [IU]/.5ML; UG/.5ML; UG/.5ML; UG/.5ML
0.5 SUSPENSION INTRAMUSCULAR ONCE
Refills: 0 | Status: COMPLETED | OUTPATIENT
Start: 2024-03-23 | End: 2024-03-23

## 2024-03-23 RX ORDER — POTASSIUM CHLORIDE 20 MEQ
40 PACKET (EA) ORAL ONCE
Refills: 0 | Status: COMPLETED | OUTPATIENT
Start: 2024-03-23 | End: 2024-03-23

## 2024-03-23 RX ADMIN — TETANUS TOXOID, REDUCED DIPHTHERIA TOXOID AND ACELLULAR PERTUSSIS VACCINE, ADSORBED 0.5 MILLILITER(S): 5; 2.5; 8; 8; 2.5 SUSPENSION INTRAMUSCULAR at 21:47

## 2024-03-23 RX ADMIN — Medication 1000 MILLIGRAM(S): at 20:51

## 2024-03-23 RX ADMIN — Medication 40 MILLIEQUIVALENT(S): at 20:21

## 2024-03-23 RX ADMIN — Medication 400 MILLIGRAM(S): at 20:21

## 2024-03-23 NOTE — H&P ADULT - PROBLEM SELECTOR PLAN 2
CT incidentally noted 8mm L pelvic renal stone with mild fullness and adjacent periureteral stranding. Patient not endorsing any urinary symptoms currently.   - UA ordered   - Monitor urine output and SCr  - If no urine output >8hrs, will check bladder scan  - Monitor off antibiotics for now

## 2024-03-23 NOTE — ED PROVIDER NOTE - PHYSICAL EXAMINATION
GENERAL: NAD  HEENT:  Minor abrasion on right anterior head.   CHEST/LUNG: Chest rise equal bilaterally  HEART: Regular rate and rhythm  ABDOMEN: Soft, Nontender, Nondistended  EXTREMITIES:  Extremities warm  PSYCH: A&Ox3  SKIN: No obvious rashes or lesions  MSK: No cervical spine TTP, able to range neck to the left and right/ +Lumbar midline TTP  NEUROLOGY: strength and sensation intact in all extremities

## 2024-03-23 NOTE — ED PROVIDER NOTE - CLINICAL SUMMARY MEDICAL DECISION MAKING FREE TEXT BOX
83-year-old female history of A-fib on Eliquis, HTN, GERD, breast cancer in remission, anxiety c/o fall. Pt was feeling tired, walking towards son, and fell forward, hitting right anterior head on wall. Denies LOC. Landed onto the right anterior shoulder. Pt had a fall yesterday when locking up the shed, unable to get up on her own, and was down for 14 hours. Otherwise asymptomatic. Denies fevers, chills, nausea, vomiting, dizziness, chest pain, SOB, abdominal pain, dysuria, hematuria. Physical as documented. TTP to lower back, bilateral knee TTP. Syncopal workup labs, CK for rhabdo, and admission for multiple falls/PT/OT/ syncopal workup.     PCP: Cheryl Moe 83-year-old female history of A-fib on Eliquis, HTN, GERD, breast cancer in remission, anxiety c/o fall. Pt was feeling tired, walking towards son, and fell forward, hitting right anterior head on wall. Denies LOC. Landed onto the right anterior shoulder. Pt had a fall yesterday when locking up the shed, unable to get up on her own, and was down for 14 hours. Otherwise asymptomatic. Denies fevers, chills, nausea, vomiting, dizziness, chest pain, SOB, abdominal pain, dysuria, hematuria. Physical as documented. TTP to lower back, bilateral knee TTP. Syncopal workup labs, CK for rhabdo, and admission for multiple falls/PT/OT/ syncopal workup.     PCP: Cheryl Sofia MD: Agree with resident/ACP MDM, assessment and plan as above.

## 2024-03-23 NOTE — H&P ADULT - PROBLEM SELECTOR PLAN 1
Likely due to recent fall. Pt with point tenderness in the lower back, but without any other alarm symptoms currently.  - CT findings reviewed showing acute appearing fracture of anterior superior endplate of L1.  - Ortho spine consulted - likely no intervention needed, pt to be placed in TLSO brace   - Pain control as needed with tylenol and lidocaine patch  - PT evaluation  - Fall precautions Likely due to recent fall. Pt with point tenderness in the lower back, but without any other alarm symptoms currently.  - CT findings reviewed showing acute appearing fracture of anterior superior endplate of L1.  - Ortho spine consulted - No acute intervention needed, pt to be placed in TLSO brace when out of bed  - Pain control as needed with tylenol and lidocaine patch  - PT evaluation  - Fall precautions   - Will hold off MR imaging given absence of neurologic deficits or findings. Pt also with L knee arthroplasty and unsure of MRI compatibility.

## 2024-03-23 NOTE — ED PROVIDER NOTE - PROGRESS NOTE DETAILS
Henry CORNELL: CT and XR and labs non-actionable except for K+ repletion. Admitted for syncopal PT OT workup w/ Dr. Oneil.

## 2024-03-23 NOTE — H&P ADULT - ASSESSMENT
INCOMPLETE NOTE 83 year old female with hx of Afib on Eliquis, HTN, GERD, Breast cancer in remission, anxiety, OA, and lymphedema presenting after a fall. Pt with acute fracture of L1 anterior superior endplate. Pt admitted for further management.

## 2024-03-23 NOTE — H&P ADULT - NSICDXPASTSURGICALHX_GEN_ALL_CORE_FT
PAST SURGICAL HISTORY:  S/P cholecystectomy LSO 1991    S/P lumpectomy of breast Bilateral with Left Axillary node Dissection    S/P tonsillectomy

## 2024-03-23 NOTE — ED PROVIDER NOTE - DIFFERENTIAL DIAGNOSIS
Ddx includes, however, is not limited to: ICH, humerus fx, infectious process, metabolic process, other Differential Diagnosis

## 2024-03-23 NOTE — ED PROVIDER NOTE - OBJECTIVE STATEMENT
83-year-old female history of A-fib on Eliquis, HTN, GERD, breast cancer in remission, anxiety c/o fall. Pt was feeling tired, walking towards son, and fell forward, hitting right anterior head on wall. Denies LOC. Landed onto the right anterior shoulder. Pt had a fall yesterday when locking up the shed, unable to get up on her own, and was down for 14 hours. Otherwise asymptomatic. Denies fevers, chills, nausea, vomiting, dizziness, chest pain, SOB, abdominal pain, dysuria, hematuria.     PCP: Cheryl Moe

## 2024-03-23 NOTE — ED ADULT NURSE NOTE - OBJECTIVE STATEMENT
pt is an 82yo female PMH Afib on eliquis, HTN, DM BIBEMS complaining of fall. pt reports fall earlier today witnessed by son, pt reports hitting head on corner of the wall, head strike noted to R frontal region, no active bleeding, area covered by bandaid. pt denies LOC, remembers before, during, and after fall, pt son states pt initially acting "confused" following fall but has since returned to baseline mental status. pt also reports unwitnessed fall onto buttocks last night, reports feeling weak/tired lately and was unable to pull self off of floor for several hours last night. per EMS, pt found hypoxic to 89% on RA, placed on 2L nc with improvement to 98%, pt does not usually require oxygen, has been experiencing some sob, [place] for the past few days/week. pt A&Ox3 gross neuro intact, no difficulty speaking in complete sentences, pulses x 4, chowdary x4, abdomen soft nontender nondistended, skin intact. pt denies chest pain, n/v/d, abdominal pain, f/c, urinary symptoms, hematuria

## 2024-03-23 NOTE — H&P ADULT - PROBLEM SELECTOR PLAN 6
Unable to verify medications overnight. Son will bring medication list tomorrow. Med list currently incomplete. Needs full med rec in AM.

## 2024-03-23 NOTE — H&P ADULT - TIME BILLING
- Ordering, reviewing, and interpreting labs, testing, and imaging.  - Independently obtaining a review of systems and performing a physical exam.  - Counselling and educating patient and family regarding interpretation of aforementioned items and plan of care.

## 2024-03-23 NOTE — H&P ADULT - HISTORY OF PRESENT ILLNESS
83 year old female with hx of Afib on Eliquis, HTN, GERD, Breast cancer in remission, anxiety, presenting after a fall.  83 year old female with hx of Afib on Eliquis, HTN, GERD, Breast cancer in remission, anxiety, presenting after a fall. Patient's son at bedside who provided additional collateral.  83 year old female with hx of Afib on Eliquis, HTN, GERD, Breast cancer in remission, anxiety, OA, and lymphedema presenting after a fall. Patient's son at bedside who provided additional collateral. Patient has been having several falls at home lately. She currently lives alone but has a  that comes occasionally. She has been independent and walks unassisted most of the time. Over the last few months, son has felt that his mother's functional status has declined. She has been having more falls, appears to lose balance more frequently, and has some cognitive changes. Son witnessed the patient fall yesterday afternoon when he came to visit her at her home. Pt suddenly fell forward and landed on her shoulder and hit her R head. No LOC. Pt attributed the event from her lack of sleep lately. She is endorsing some lower back pain but currently without any other symptoms. Pt also had a fall the day prior and was unable to get back up due to her bad knees. She stayed on the floor for a few hours but was able to crawl around until help came. Pt denied any chest pain, abdominal pain, urinary retention, nausea, vomiting, dizziness or lightheadedness. Has some urinary incontinence which she says is not new. Also some dyspnea on exertion. She currently receives outpatient physical therapy twice a week. Per son, pt was hypoxic when EMS arrived and was placed on nasal cannula. Pt does not usually use oxygen at home.     In the ED, patient received tylenol for pain and K repletion. CT imaging notable for acute appearing fracture of anterior superior endplate of L1. Pt admitted to medicine for further workup.

## 2024-03-23 NOTE — ED ADULT NURSE NOTE - TEMPLATE LIST FOR HEAD TO TOE ASSESSMENT
Occupational Therapy  4CD    Patient not seen in therapy.     Patient with nursing    Re-attempt plan: per established plan of care                               Therapy procedure time and total treatment time can be found documented on the Time Entry flowsheet   General

## 2024-03-23 NOTE — H&P ADULT - NSHPREVIEWOFSYSTEMS_GEN_ALL_CORE
Review of Systems:   CONSTITUTIONAL: No fever, weight loss  EYES: No eye pain, visual disturbances, or discharge  ENMT:  No difficulty hearing, tinnitus, vertigo; No sinus or throat pain  RESPIRATORY: No SOB. No cough, wheezing, chills or hemoptysis  CARDIOVASCULAR: No chest pain, palpitations, dizziness, or leg swelling  GASTROINTESTINAL: No abdominal or epigastric pain. No nausea, vomiting, or hematemesis; No diarrhea or constipation. No melena or hematochezia.  GENITOURINARY: No dysuria, frequency, hematuria, or incontinence  NEUROLOGICAL: No headaches, memory loss, loss of strength, numbness, or tremors  SKIN: No itching, burning, rashes, or lesions   LYMPH NODES: No enlarged glands  ENDOCRINE: No heat or cold intolerance; No hair loss  MUSCULOSKELETAL: No joint pain or swelling; No muscle, back pain  PSYCHIATRIC: No depression, anxiety, mood swings, or difficulty sleeping  HEME/LYMPH: No easy bruising, or bleeding gums Review of Systems:   CONSTITUTIONAL: No fever, weight loss  EYES: No eye pain, visual disturbances, or discharge  ENMT:  No difficulty hearing, tinnitus, vertigo; No sinus or throat pain  RESPIRATORY: No SOB. No cough, wheezing, chills or hemoptysis  CARDIOVASCULAR: No chest pain, palpitations, dizziness, or leg swelling  GASTROINTESTINAL: No abdominal or epigastric pain. No nausea, vomiting, or hematemesis; No diarrhea or constipation. No melena or hematochezia.  GENITOURINARY: No dysuria, frequency, hematuria, +incontinence  NEUROLOGICAL: No headaches, memory loss, loss of strength, numbness, or tremors  SKIN: No itching, burning, rashes, or lesions   MUSCULOSKELETAL: No joint pain or swelling; +lower back tenderness  PSYCHIATRIC: +anxiety  HEME/LYMPH: No easy bruising, or bleeding gums

## 2024-03-23 NOTE — H&P ADULT - PROBLEM SELECTOR PLAN 4
Mildly elevated CK likely due to prolonged time on floor following fall the day prior.  - Will trend CK for now  - Gentle IVF to prevent DIONISIO  - F/u UA

## 2024-03-23 NOTE — H&P ADULT - PROBLEM SELECTOR PLAN 7
DVT ppx: on Eliquis  Diet: DASH  Dispo: pending, PT eval    Per son, multiple falls at home. Pt used to be independent and was driving up until a few months ago. However son feels patient may now need more assistance and at home and would like to consider options. Would like to speak with case management.     Patient's son Keshav Raymundo updated 3/24/24 early AM

## 2024-03-23 NOTE — H&P ADULT - NSICDXPASTMEDICALHX_GEN_ALL_CORE_FT
PAST MEDICAL HISTORY:  Anxiety     Breast cancer Right 2000 Radiation  Left 2004  Radiation    GERD (gastroesophageal reflux disease)     HTN (hypertension), benign     Lymph edema Left    Migraine

## 2024-03-23 NOTE — H&P ADULT - PROBLEM SELECTOR PLAN 5
Hx of chronic afib on Eliquis and atenolol  - Continue Eliquis 5mg BID  - Continue atenolol 25mg BID; monitor BP and HR  - EKG reviewed showing slow Afib HR 65, qtc 480  - Monitor on telemetry for now

## 2024-03-23 NOTE — H&P ADULT - PROBLEM SELECTOR PLAN 3
Slightly low K 3.4 likely from medication HCTZ.  - S/p 40meq KCl repletion in ED  - Hold HCTZ for now  - F/u repeat BMP in AM

## 2024-03-23 NOTE — H&P ADULT - NSHPPHYSICALEXAM_GEN_ALL_CORE
Vital Signs Last 24 Hrs  T(C): 36.4 (24 Mar 2024 01:17), Max: 36.6 (23 Mar 2024 18:46)  T(F): 97.5 (24 Mar 2024 01:17), Max: 97.8 (23 Mar 2024 18:46)  HR: 68 (24 Mar 2024 01:17) (66 - 69)  BP: 108/68 (24 Mar 2024 01:17) (108/68 - 128/58)  BP(mean): 77 (23 Mar 2024 21:57) (76 - 77)  RR: 18 (24 Mar 2024 01:17) (16 - 20)  SpO2: 97% (24 Mar 2024 01:17) (97% - 99%)    Parameters below as of 24 Mar 2024 01:17  Patient On (Oxygen Delivery Method): nasal cannula  O2 Flow (L/min): 2      CONSTITUTIONAL: Well-groomed, in no apparent distress  EYES: No conjunctival or scleral injection, non-icteric; PERRLA and symmetric  ENMT: No external nasal lesions; nasal mucosa not inflamed; oral mucosa with moist membranes  RESPIRATORY: Breathing comfortably; lungs CTA without wheeze/rhonchi/rales  CARDIOVASCULAR: +S1S2, RRR, no M/G/R; 1+ lower extremity edema  GASTROINTESTINAL: No palpable masses or tenderness, +BS throughout, no rebound/guarding; no hepatosplenomegaly; no hernia palpated  MUSCULOSKELETAL: No digital clubbing or cyanosis; no no cervical tenderness; low back tenderness on palpation; no malalignment of extremities  SKIN: small laceration in R lateral forehead with hemostasis, no active bleeding  NEUROLOGIC: CN grossly intact; sensation intact in LEs b/l to light touch; normal strength and tone  PSYCHIATRIC: A+O x 3; mood and affect appropriate; appropriate insight and judgment

## 2024-03-23 NOTE — H&P ADULT - NSHPLABSRESULTS_GEN_ALL_CORE
03-23    140  |  100  |  19  ----------------------------<  139<H>  3.4<L>   |  22  |  0.87    Ca    9.7      23 Mar 2024 19:21    TPro  7.2  /  Alb  3.6  /  TBili  0.8  /  DBili  x   /  AST  17  /  ALT  7<L>  /  AlkPhos  93  03-23      PT/INR - ( 23 Mar 2024 19:21 )   PT: 29.9 sec;   INR: 2.80 ratio         PTT - ( 23 Mar 2024 19:21 )  PTT:39.7 sec              Urinalysis Basic - ( 23 Mar 2024 19:21 )    Color: x / Appearance: x / SG: x / pH: x  Gluc: 139 mg/dL / Ketone: x  / Bili: x / Urobili: x   Blood: x / Protein: x / Nitrite: x   Leuk Esterase: x / RBC: x / WBC x   Sq Epi: x / Non Sq Epi: x / Bacteria: x                              9.8    10.84 )-----------( 352      ( 23 Mar 2024 19:21 )             33.1     CAPILLARY BLOOD GLUCOSE 03-23    140  |  100  |  19  ----------------------------<  139<H>  3.4<L>   |  22  |  0.87    Ca    9.7      23 Mar 2024 19:21    TPro  7.2  /  Alb  3.6  /  TBili  0.8  /  DBili  x   /  AST  17  /  ALT  7<L>  /  AlkPhos  93  03-23      PT/INR - ( 23 Mar 2024 19:21 )   PT: 29.9 sec;   INR: 2.80 ratio         PTT - ( 23 Mar 2024 19:21 )  PTT:39.7 sec              Urinalysis Basic - ( 23 Mar 2024 19:21 )    Color: x / Appearance: x / SG: x / pH: x  Gluc: 139 mg/dL / Ketone: x  / Bili: x / Urobili: x   Blood: x / Protein: x / Nitrite: x   Leuk Esterase: x / RBC: x / WBC x   Sq Epi: x / Non Sq Epi: x / Bacteria: x                          9.8    10.84 )-----------( 352      ( 23 Mar 2024 19:21 )             33.1     IMAGING    < from: CT Head No Cont (03.23.24 @ 20:38) >    IMPRESSION:  BRAIN:  Stable intracranial findings. No acute hemorrhage, extra-axial collection   or vasogenic edema. Right frontal scalp laceration withsubcutaneous   emphysema. No calvarial fracture.    CERVICAL SPINE:  No acute fracture or traumatic malalignment.  Multilevel spondylosis and degenerative disc disease above.    < end of copied text >    < from: CT Lumbar Spine No Cont (03.23.24 @ 20:38) >    IMPRESSION:  Acute appearing fracture of the anterior superior endplate of L1   involving the anterior bridging osteophyte and posteriorly the right   pedicle into the superior facet. No associated facet widening or   significant prevertebral body height loss. Age-indeterminate fracture   right anterior bridging marginal osteophyte L2/L3 as above. Consider   further evaluation with lumbar spine MRI to better evaluate the   ligamentous structures and intraspinal canal contents.    Multilevel spondylosis and degenerative disc disease as above    8mm left pelvic renal stone with mild fullness of the pelvis and adjacent   periureteral stranding. Please correlate with urinalysis to exclude   superimposed infection.    < end of copied text >    < from: Xray Shoulder 2 Views, Right (03.23.24 @ 21:02) >    FINDINGS:    No acute fracture or dislocation.  Moderate glenohumeral and acromioclavicularjoint arthrosis.  Soft tissues are unremarkable. Partially visualized lung fields are clear.    IMPRESSION:  No acute fracture or dislocation.    < end of copied text >    < from: Xray Knee 4 Views, Bilateral (03.23.24 @ 21:02) >    IMPRESSION:  No acute fracture dislocation. Intact left knee arthroplasty.    Moderate to severe right tricompartmental arthrosis, most pronounced in   the lateral compartment. Right joint effusion.    < end of copied text >

## 2024-03-24 ENCOUNTER — TRANSCRIPTION ENCOUNTER (OUTPATIENT)
Age: 84
End: 2024-03-24

## 2024-03-24 DIAGNOSIS — S32.019A UNSPECIFIED FRACTURE OF FIRST LUMBAR VERTEBRA, INITIAL ENCOUNTER FOR CLOSED FRACTURE: ICD-10-CM

## 2024-03-24 DIAGNOSIS — Z79.899 OTHER LONG TERM (CURRENT) DRUG THERAPY: ICD-10-CM

## 2024-03-24 DIAGNOSIS — R74.8 ABNORMAL LEVELS OF OTHER SERUM ENZYMES: ICD-10-CM

## 2024-03-24 DIAGNOSIS — I48.20 CHRONIC ATRIAL FIBRILLATION, UNSPECIFIED: ICD-10-CM

## 2024-03-24 DIAGNOSIS — I48.0 PAROXYSMAL ATRIAL FIBRILLATION: ICD-10-CM

## 2024-03-24 DIAGNOSIS — Z29.9 ENCOUNTER FOR PROPHYLACTIC MEASURES, UNSPECIFIED: ICD-10-CM

## 2024-03-24 DIAGNOSIS — R29.6 REPEATED FALLS: ICD-10-CM

## 2024-03-24 DIAGNOSIS — E87.6 HYPOKALEMIA: ICD-10-CM

## 2024-03-24 DIAGNOSIS — N20.0 CALCULUS OF KIDNEY: ICD-10-CM

## 2024-03-24 LAB
ANION GAP SERPL CALC-SCNC: 16 MMOL/L — SIGNIFICANT CHANGE UP (ref 5–17)
BUN SERPL-MCNC: 22 MG/DL — SIGNIFICANT CHANGE UP (ref 7–23)
CALCIUM SERPL-MCNC: 9.1 MG/DL — SIGNIFICANT CHANGE UP (ref 8.4–10.5)
CHLORIDE SERPL-SCNC: 102 MMOL/L — SIGNIFICANT CHANGE UP (ref 96–108)
CK SERPL-CCNC: 236 U/L — HIGH (ref 25–170)
CO2 SERPL-SCNC: 24 MMOL/L — SIGNIFICANT CHANGE UP (ref 22–31)
CREAT SERPL-MCNC: 1.03 MG/DL — SIGNIFICANT CHANGE UP (ref 0.5–1.3)
EGFR: 54 ML/MIN/1.73M2 — LOW
GLUCOSE SERPL-MCNC: 116 MG/DL — HIGH (ref 70–99)
HCT VFR BLD CALC: 31.5 % — LOW (ref 34.5–45)
HGB BLD-MCNC: 9.4 G/DL — LOW (ref 11.5–15.5)
MAGNESIUM SERPL-MCNC: 2 MG/DL — SIGNIFICANT CHANGE UP (ref 1.6–2.6)
MCHC RBC-ENTMCNC: 23.3 PG — LOW (ref 27–34)
MCHC RBC-ENTMCNC: 29.8 GM/DL — LOW (ref 32–36)
MCV RBC AUTO: 78 FL — LOW (ref 80–100)
NRBC # BLD: 0 /100 WBCS — SIGNIFICANT CHANGE UP (ref 0–0)
PHOSPHATE SERPL-MCNC: 4.5 MG/DL — SIGNIFICANT CHANGE UP (ref 2.5–4.5)
PLATELET # BLD AUTO: 339 K/UL — SIGNIFICANT CHANGE UP (ref 150–400)
POTASSIUM SERPL-MCNC: 3.3 MMOL/L — LOW (ref 3.5–5.3)
POTASSIUM SERPL-SCNC: 3.3 MMOL/L — LOW (ref 3.5–5.3)
RBC # BLD: 4.04 M/UL — SIGNIFICANT CHANGE UP (ref 3.8–5.2)
RBC # FLD: 16.7 % — HIGH (ref 10.3–14.5)
SODIUM SERPL-SCNC: 142 MMOL/L — SIGNIFICANT CHANGE UP (ref 135–145)
WBC # BLD: 7.72 K/UL — SIGNIFICANT CHANGE UP (ref 3.8–10.5)
WBC # FLD AUTO: 7.72 K/UL — SIGNIFICANT CHANGE UP (ref 3.8–10.5)

## 2024-03-24 PROCEDURE — 71045 X-RAY EXAM CHEST 1 VIEW: CPT | Mod: 26

## 2024-03-24 PROCEDURE — 72128 CT CHEST SPINE W/O DYE: CPT | Mod: 26

## 2024-03-24 RX ORDER — ACETAMINOPHEN 500 MG
975 TABLET ORAL EVERY 6 HOURS
Refills: 0 | Status: DISCONTINUED | OUTPATIENT
Start: 2024-03-24 | End: 2024-03-30

## 2024-03-24 RX ORDER — APIXABAN 2.5 MG/1
5 TABLET, FILM COATED ORAL EVERY 12 HOURS
Refills: 0 | Status: DISCONTINUED | OUTPATIENT
Start: 2024-03-24 | End: 2024-03-26

## 2024-03-24 RX ORDER — DIAZEPAM 5 MG
2.5 TABLET ORAL DAILY
Refills: 0 | Status: DISCONTINUED | OUTPATIENT
Start: 2024-03-24 | End: 2024-03-29

## 2024-03-24 RX ORDER — POTASSIUM CHLORIDE 20 MEQ
40 PACKET (EA) ORAL ONCE
Refills: 0 | Status: COMPLETED | OUTPATIENT
Start: 2024-03-24 | End: 2024-03-24

## 2024-03-24 RX ORDER — LIDOCAINE 4 G/100G
1 CREAM TOPICAL DAILY
Refills: 0 | Status: DISCONTINUED | OUTPATIENT
Start: 2024-03-24 | End: 2024-03-30

## 2024-03-24 RX ORDER — ATENOLOL 25 MG/1
25 TABLET ORAL EVERY 12 HOURS
Refills: 0 | Status: DISCONTINUED | OUTPATIENT
Start: 2024-03-24 | End: 2024-03-25

## 2024-03-24 RX ORDER — POLYETHYLENE GLYCOL 3350 17 G/17G
17 POWDER, FOR SOLUTION ORAL
Refills: 0 | Status: DISCONTINUED | OUTPATIENT
Start: 2024-03-24 | End: 2024-03-30

## 2024-03-24 RX ORDER — SERTRALINE 25 MG/1
50 TABLET, FILM COATED ORAL DAILY
Refills: 0 | Status: DISCONTINUED | OUTPATIENT
Start: 2024-03-24 | End: 2024-03-30

## 2024-03-24 RX ADMIN — LIDOCAINE 1 PATCH: 4 CREAM TOPICAL at 18:37

## 2024-03-24 RX ADMIN — POLYETHYLENE GLYCOL 3350 17 GRAM(S): 17 POWDER, FOR SOLUTION ORAL at 18:35

## 2024-03-24 RX ADMIN — LIDOCAINE 1 PATCH: 4 CREAM TOPICAL at 19:00

## 2024-03-24 RX ADMIN — APIXABAN 5 MILLIGRAM(S): 2.5 TABLET, FILM COATED ORAL at 05:05

## 2024-03-24 RX ADMIN — ATENOLOL 25 MILLIGRAM(S): 25 TABLET ORAL at 05:05

## 2024-03-24 RX ADMIN — ATENOLOL 25 MILLIGRAM(S): 25 TABLET ORAL at 18:33

## 2024-03-24 RX ADMIN — Medication 40 MILLIEQUIVALENT(S): at 12:57

## 2024-03-24 RX ADMIN — APIXABAN 5 MILLIGRAM(S): 2.5 TABLET, FILM COATED ORAL at 18:28

## 2024-03-24 RX ADMIN — SERTRALINE 50 MILLIGRAM(S): 25 TABLET, FILM COATED ORAL at 12:00

## 2024-03-24 NOTE — PHYSICAL THERAPY INITIAL EVALUATION ADULT - PLANNED THERAPY INTERVENTIONS, PT EVAL
Stairs: GOAL: Pt will ascend/descend 4 stairs (I) in order to return home safety in 2 weeks./bed mobility training/gait training/strengthening/transfer training

## 2024-03-24 NOTE — DISCHARGE NOTE PROVIDER - NSFOLLOWUPCLINICS_GEN_ALL_ED_FT
[facility]  Urology  [address] [place] [address]  Phone: [phone]  Fax:   Follow Up Time: 2 weeks

## 2024-03-24 NOTE — PROGRESS NOTE ADULT - SUBJECTIVE AND OBJECTIVE BOX
Jonatan Moreno PHYSICAL THERAPY - DAILY TREATMENT NOTE    Patient Name: Courtney Frances        Date: 2022  : 1943   yes Patient  Verified  Visit #:   34   of   29  Insurance: Payor: [employer] / Plan: [place] MEDICARE PART A & B / Product Type: Medicare /      In time:  Out time: 1140   Total Treatment Time: 40     Medicare/BCBS Time Tracking (below)   Total Timed Codes (min): 40 1:1 Treatment Time:  40     TREATMENT AREA =  Right knee pain [M25.561]    SUBJECTIVE  Pain Level (on 0 to 10 scale):  1/ 10   Medication Changes/New allergies or changes in medical history, any new surgeries or procedures?    no  If yes, update Summary List   Subjective Functional Status/Changes:  []  No changes reported   I feel just tired today      OBJECTIVE    25 min Therapeutic Exercise:  [x]  See flow sheet   Rationale:      increase ROM and increase strength to improve the patients ability to complete adls     15 min Manual Therapy: Hs/gastroc stretch, knee flexion    Rationale:      decrease pain, increase ROM, increase tissue extensibility and decrease trigger points to improve patient's ability to squat  The manual therapy interventions were performed at a separate and distinct time from the therapeutic activities interventions.       Billed With/As:   [] TE   [] TA   [] Neuro   [] Self Care Patient Education: [x] Review HEP    [] Progressed/Changed HEP based on:   [] positioning   [] body mechanics   [] transfers   [] heat/ice application    [] other:      Other Objective/Functional Measures:  Able to perform squats to parallel with trx pain free     Post Treatment Pain Level (on 0 to 10) scale:  0/ 10     ASSESSMENT  Assessment/Changes in Function:   No adverse reactions following session    []  See Progress Note/Recertification   Patient will continue to benefit from skilled PT services to modify and progress therapeutic interventions, address functional mobility deficits, address ROM deficits, address strength deficits, analyze and address soft tissue restrictions, analyze and cue movement patterns, analyze and modify body mechanics/ergonomics, assess and modify postural abnormalities, address imbalance/dizziness and instruct in home and community integration to attain remaining goals.    Progress toward goals / Updated goals:    Pt has consistently maintained 0 degrees arom - goal achieved      PLAN  []  Upgrade activities as tolerated yes Continue plan of care   []  Discharge due to :    []  Other:      Therapist: Alice Herrera PT    Date: 7/14/2022 Time: 2:50 PM     Future Appointments   Date Time Provider Lisset Chawla   7/14/2022 11:00 AM Phill Stephens, JAKE Miller [id number]   7/19/2022  2:30 PM [facility]   7/21/2022 11:00 AM Phill Stephens PT [facility]   7/26/2022  2:00 PM [facility]   7/27/2022  3:45 PM Phill Stephens PT [facility]   7/28/2022 11:00 AM JAKE Salas [facility]   1/11/2023 10:30 AM [facility] [address]   1/11/2023 11:00 AM David Odonnell 27 PROGRESS NOTE:   Authored by Dr. Gatito Vargas MD (PGY-1)    Patient is a 83y old  Female who presents with a chief complaint of Fall (24 Mar 2024 09:55)      SUBJECTIVE / OVERNIGHT EVENTS:  No acute events overnight. Pt seen at bedside and no acute complaints or concerns at this time.       MEDICATIONS  (STANDING):  apixaban 5 milliGRAM(s) Oral every 12 hours  atenolol  Tablet 25 milliGRAM(s) Oral every 12 hours  lidocaine   4% Patch 1 Patch Transdermal daily  potassium chloride    Tablet ER 40 milliEquivalent(s) Oral once  sertraline 50 milliGRAM(s) Oral daily    MEDICATIONS  (PRN):  acetaminophen     Tablet .. 975 milliGRAM(s) Oral every 6 hours PRN Temp greater or equal to 38C (100.4F), Moderate Pain (4 - 6)  diazepam    Tablet 2.5 milliGRAM(s) Oral daily PRN anxiety      CAPILLARY BLOOD GLUCOSE        I&O's Summary    23 Mar 2024 07:01  -  24 Mar 2024 07:00  --------------------------------------------------------  IN: 250 mL / OUT: 0 mL / NET: 250 mL        PHYSICAL EXAM:  Vital Signs Last 24 Hrs  T(C): 36.8 (24 Mar 2024 12:20), Max: 36.8 (24 Mar 2024 12:20)  T(F): 98.3 (24 Mar 2024 12:20), Max: 98.3 (24 Mar 2024 12:20)  HR: 70 (24 Mar 2024 12:20) (66 - 76)  BP: 124/65 (24 Mar 2024 12:20) (100/65 - 129/73)  BP(mean): 77 (23 Mar 2024 21:57) (76 - 77)  RR: 18 (24 Mar 2024 12:20) (16 - 20)  SpO2: 95% (24 Mar 2024 12:20) (95% - 99%)    Parameters below as of 24 Mar 2024 12:20  Patient On (Oxygen Delivery Method): nasal cannula  O2 Flow (L/min): 2      CONSTITUTIONAL: Well-groomed, in no apparent distress  EYES: No conjunctival or scleral injection, EOM grossly intact and symmetric  ENMT: MMM  RESPIRATORY: Breathing comfortably; lungs CTA without wheeze/rhonchi/rales  CARDIOVASCULAR: +S1S2, RRR, no M/G/R; 1+ lower extremity edema, equal  GASTROINTESTINAL: No palpable masses or tenderness, +BS throughout, no rebound/guarding; no hepatosplenomegaly; no hernia palpated  MUSCULOSKELETAL: No digital clubbing or cyanosis; no no cervical tenderness; low back tenderness on palpation; no malalignment of extremities  SKIN: small laceration in R lateral forehead with hemostasis, no active bleeding. RUE chronic lymphedema   NEUROLOGIC: CN grossly intact; sensation intact in LEs b/l to light touch; normal strength and tone in UE/LE bl  PSYCHIATRIC: A+O x 3; mood and affect appropriate; appropriate insight and judgment    LABS:                        9.4    7.72  )-----------( 339      ( 24 Mar 2024 07:12 )             31.5     03-24    142  |  102  |  22  ----------------------------<  116<H>  3.3<L>   |  24  |  1.03    Ca    9.1      24 Mar 2024 07:12  Phos  4.5     03-24  Mg     2.0     03-24    TPro  7.2  /  Alb  3.6  /  TBili  0.8  /  DBili  x   /  AST  17  /  ALT  7<L>  /  AlkPhos  93  03-23    PT/INR - ( 23 Mar 2024 19:21 )   PT: 29.9 sec;   INR: 2.80 ratio         PTT - ( 23 Mar 2024 19:21 )  PTT:39.7 sec  CARDIAC MARKERS ( 24 Mar 2024 07:12 )  x     / x     / 236 U/L / x     / x      CARDIAC MARKERS ( 23 Mar 2024 19:21 )  x     / x     / 257 U/L / x     / 3.4 ng/mL      Urinalysis Basic - ( 24 Mar 2024 07:12 )    Color: x / Appearance: x / SG: x / pH: x  Gluc: 116 mg/dL / Ketone: x  / Bili: x / Urobili: x   Blood: x / Protein: x / Nitrite: x   Leuk Esterase: x / RBC: x / WBC x   Sq Epi: x / Non Sq Epi: x / Bacteria: x          Tele Reviewed:    RADIOLOGY & ADDITIONAL TESTS:  Results Reviewed:   Imaging Personally Reviewed:  Electrocardiogram Personally Reviewed:

## 2024-03-24 NOTE — PROGRESS NOTE ADULT - PROBLEM SELECTOR PLAN 1
Likely due to recent fall. Pt with point tenderness in the lower back, but without any other alarm symptoms currently.  - CT findings reviewed showing acute appearing fracture of anterior superior endplate of L1.  - Ortho spine consulted - No acute intervention needed, pt to be placed in TLSO brace when out of bed  - Pain control as needed with tylenol and lidocaine patch  - PT evaluation when pt more stable/able to tolerate  - Fall precautions   - Will hold off MR imaging given absence of neurologic deficits or findings. Pt also with L knee arthroplasty and unsure of MRI compatibility

## 2024-03-24 NOTE — PATIENT PROFILE ADULT - ARE SIGNIFICANT INDICATORS COMPLETE.
[Follow-Up - Routine Clinic] : a routine clinic follow-up of [Excessive Daytime Sleepiness] : excessive daytime sleepiness [Snoring] : snoring [Unrefreshing Sleep] : unrefreshing sleep [Sleepy When Sedentary] : sleepy when sedentary [Currently Experiencing] : The patient is currently experiencing symptoms. [None] : The patient is not currently being treated for this problem [TextBox_4] : Never smoker.\par Pt with covid infection 3/2020 with mild symptoms and did not require therapy. Pt s/p Covid infection again 4/2022 with rhinorrhea, nasal symptoms and was treated with Molnupiravir.\par Denies respiratory complaints.\par Follows with cardiology for abnormal EKG but w/u was normal per pt.\par On Metformin for prediabetes.\par \par  [ESS] : 10 [TextBox_11] : 7 [Witnessed Apnea During Sleep] : no witnessed apnea during sleep [Witnessed Gasping During Sleep] : no witnessed gasping during sleep No

## 2024-03-24 NOTE — PROGRESS NOTE ADULT - PROBLEM SELECTOR PLAN 2
CT incidentally noted 8mm L pelvic renal stone with mild fullness and adjacent periureteral stranding. Patient not endorsing any urinary symptoms currently.   - UA & UCx ordered   - Monitor urine output and SCr  - If poor urine output, can check bladder scan  - Monitor off antibiotics for now

## 2024-03-24 NOTE — DISCHARGE NOTE PROVIDER - CARE PROVIDER_API CALL
Cheryl Lora  Internal Medicine  [address] [place] [address]  Phone: [phone]  Fax: [phone]  Follow Up Time: 1 week   Cheryl Lora  Internal Medicine  [address] [place] [id number]  Phone: [phone]  Fax: [phone]  Follow Up Time: 1 week    Prasanna Buchanan)  Orthopaedic Surgery  [address] [place] [address], [facility] [address]  [place] [address]  Phone: [phone]  Fax: [phone]  Follow Up Time: 1 week    Ovidio Cabrera  Gastroenterology  [address] [place] [address]  Phone: [phone]  Fax: [phone]  Follow Up Time:

## 2024-03-24 NOTE — DISCHARGE NOTE PROVIDER - CARE PROVIDERS DIRECT ADDRESSES
,[email or web address],DirectAddress_Unknown

## 2024-03-24 NOTE — PROGRESS NOTE ADULT - ASSESSMENT
83 year old female with hx of Afib on Eliquis, HTN, GERD, Breast cancer in remission, anxiety, OA, and lymphedema presenting after a fall. Pt with acute fracture of L1 anterior superior endplate. Pt admitted for further management.

## 2024-03-24 NOTE — DISCHARGE NOTE PROVIDER - PROVIDER TOKENS
PROVIDER:[TOKEN:[[id number]],FOLLOWUP:[1 week]] PROVIDER:[TOKEN:[[id number]],FOLLOWUP:[1 week]],PROVIDER:[TOKEN:[[id number]],FOLLOWUP:[1 week]],PROVIDER:[TOKEN:[[id number]]]

## 2024-03-24 NOTE — PATIENT PROFILE ADULT - FUNCTIONAL ASSESSMENT - BASIC MOBILITY 6.
2-calculated by average/Not able to assess (calculate score using [facility] averaging method)  2-calculated by average/Not able to assess (calculate score using [facility] averaging method)

## 2024-03-24 NOTE — PHYSICAL THERAPY INITIAL EVALUATION ADULT - PERTINENT HX OF CURRENT PROBLEM, REHAB EVAL
Pt is a 83-year-old female history of A-fib on Eliquis, HTN, GERD, breast cancer in remission, anxiety c/o fall. Pt was feeling tired, walking towards son, and fell forward, hitting right anterior head on wall. Denies LOC. Landed onto the right anterior shoulder. Pt had a fall yesterday when locking up the shed, unable to get up on her own, and was down for 14 hours. Otherwise asymptomatic. Denies fevers, chills, nausea, vomiting, dizziness, chest pain, SOB, abdominal pain, dysuria, hematuria.   XR Right Shoulder: No acute fracture or dislocation. Moderate glenohumeral and acromioclavicular joint arthrosis. Soft tissues are unremarkable. Partially visualized lung fields are clear.  XR b/l Knees: No acute fracture dislocation. Intact left knee arthroplasty.Moderate to severe right tricompartmental arthrosis, most pronounced in the lateral compartment. Right joint effusion.  CT L-spine: IMPRESSION: Acute appearing fracture of the anterior superior endplate of L1 involving the anterior bridging osteophyte and posteriorly the right pedicle into the superior facet. No associated facet widening or significant prevertebral body height loss. Age-indeterminate fracture right anterior bridging marginal osteophyte L2/L3 as above. Multilevel spondylosis and degenerative disc disease as above 8mm left pelvic renal stone with mild fullness of the pelvis and adjacent periureteral stranding. Please correlate with urinalysis to exclude superimposed infection.  CTH: Stable intracranial findings. No acute hemorrhage, extra-axial collection or vasogenic edema. Right frontal scalp laceration with subcutaneous emphysema. No calvarial fracture.

## 2024-03-24 NOTE — DISCHARGE NOTE PROVIDER - NSDCMRMEDTOKEN_GEN_ALL_CORE_FT
atenolol 25 mg oral tablet: 1 tab(s) orally 2 times a day  Eliquis 5 mg oral tablet: 1 tab(s) orally 2 times a day  hydroCHLOROthiazide 25 mg oral tablet: 1 tab(s) orally once a day  metFORMIN 500 mg oral tablet: 1 tab(s) orally once a day  sertraline 50 mg oral tablet: 1 tab(s) orally once a day  TLSO: to wear when out of bed  Valium 5 mg oral tablet: 0.5 tab(s) orally once a day as needed for  anxiety   atenolol 25 mg oral tablet: 1 tab(s) orally 2 times a day  Eliquis 5 mg oral tablet: 1 tab(s) orally 2 times a day  hydroCHLOROthiazide 25 mg oral tablet: 1 tab(s) orally once a day  meloxicam 7.5 mg oral tablet: 1 tab(s) orally once a day  metFORMIN 500 mg oral tablet: 1 tab(s) orally once a day  sertraline 50 mg oral tablet: 1 tab(s) orally once a day  TLSO: to wear when out of bed  Valium 5 mg oral tablet: 0.5 tab(s) orally once a day as needed for  anxiety  vitamin d: 1 tablet daily   acetaminophen 325 mg oral tablet: 3 tab(s) orally every 6 hours As needed Temp greater or equal to 38C (100.4F), Moderate Pain (4 - 6)  atenolol 25 mg oral tablet: 1 tab(s) orally 2 times a day  Eliquis 5 mg oral tablet: 1 tab(s) orally 2 times a day  ertapenem 1 g injection: 1 gram(s) intravenous every 24 hours infuse over 30 minutes; end date 4/1/24  ferrous sulfate 325 mg (65 mg elemental iron) oral tablet: 1 tab(s) orally once a day  furosemide 20 mg oral tablet: 1 tab(s) orally once a day  lidocaine 4% topical film: Apply topically to affected area once a day  metFORMIN 500 mg oral tablet: 1 tab(s) orally once a day  pantoprazole 40 mg oral delayed release tablet: 1 tab(s) orally once a day (before a meal)  polyethylene glycol 3350 oral powder for reconstitution: 17 gram(s) orally 2 times a day  sertraline 50 mg oral tablet: 1 tab(s) orally once a day  TLSO: to wear when out of bed  Valium 5 mg oral tablet: 0.5 tab(s) orally once a day as needed for  anxiety  vitamin d: 1 tablet daily

## 2024-03-24 NOTE — PROGRESS NOTE ADULT - PROBLEM SELECTOR PLAN 4
Mildly elevated CK likely due to prolonged time on floor following fall the day prior  - Will trend CK for now downtrending  - Gentle IVF to prevent DIONISIO, will CTM Cr  - F/u UA as above

## 2024-03-24 NOTE — PHYSICAL THERAPY INITIAL EVALUATION ADULT - ADDITIONAL COMMENTS
pt resides in a private home with 4 stairs to enter and once inside remains on 1st floor. Pt ambulates with a rolling walker and independent with all ADLs pta.

## 2024-03-24 NOTE — PATIENT PROFILE ADULT - FALL HARM RISK - HARM RISK INTERVENTIONS
Assistance with ambulation/Assistance OOB with selected safe patient handling equipment/Communicate Risk of Fall with Harm to all staff/Discuss with provider need for PT consult/Monitor gait and stability/Reinforce activity limits and safety measures with patient and family/Tailored Fall Risk Interventions/Visual Cue: Yellow wristband and red socks/Bed in lowest position, wheels locked, appropriate side rails in place/Call bell, personal items and telephone in reach/Instruct patient to call for assistance before getting out of bed or chair/Non-slip footwear when patient is out of bed/[place] to call system/Physically safe environment - no spills, clutter or unnecessary equipment/Purposeful Proactive Rounding/Room/bathroom lighting operational, light cord in reach

## 2024-03-24 NOTE — PROGRESS NOTE ADULT - SUBJECTIVE AND OBJECTIVE BOX
INTERVAL HPI/OVERNIGHT EVENTS:  Pt seen and examined at bedside.     Allergies/Intolerance: codeine (Nausea)      MEDICATIONS  (STANDING):  apixaban 5 milliGRAM(s) Oral every 12 hours  atenolol  Tablet 25 milliGRAM(s) Oral every 12 hours  lidocaine   4% Patch 1 Patch Transdermal daily  sertraline 50 milliGRAM(s) Oral daily    MEDICATIONS  (PRN):  acetaminophen     Tablet .. 975 milliGRAM(s) Oral every 6 hours PRN Temp greater or equal to 38C (100.4F), Moderate Pain (4 - 6)  diazepam    Tablet 2.5 milliGRAM(s) Oral daily PRN anxiety        ROS: all systems reviewed and wnl      PHYSICAL EXAMINATION:  Vital Signs Last 24 Hrs  T(C): 36.3 (24 Mar 2024 04:59), Max: 36.6 (23 Mar 2024 18:46)  T(F): 97.3 (24 Mar 2024 04:59), Max: 97.8 (23 Mar 2024 18:46)  HR: 72 (24 Mar 2024 04:59) (66 - 72)  BP: 129/73 (24 Mar 2024 04:59) (108/68 - 129/73)  BP(mean): 77 (23 Mar 2024 21:57) (76 - 77)  RR: 18 (24 Mar 2024 04:59) (16 - 20)  SpO2: 95% (24 Mar 2024 04:59) (95% - 99%)    Parameters below as of 24 Mar 2024 04:59  Patient On (Oxygen Delivery Method): nasal cannula  O2 Flow (L/min): 2    CAPILLARY BLOOD GLUCOSE          03-23 @ 07:01  -  03-24 @ 07:00  --------------------------------------------------------  IN: 250 mL / OUT: 0 mL / NET: 250 mL        GENERAL:   NECK: supple, No JVD  CHEST/LUNG: clear to auscultation bilaterally; no rales, rhonchi, or wheezing b/l  HEART: normal S1, S2  ABDOMEN: BS+, soft, ND, NT   EXTREMITIES:  pulses palpable; no clubbing, cyanosis, or edema b/l LEs  SKIN: no rashes or lesions      LABS:                        9.4    7.72  )-----------( 339      ( 24 Mar 2024 07:12 )             31.5     03-24    142  |  102  |  22  ----------------------------<  116<H>  3.3<L>   |  24  |  1.03    Ca    9.1      24 Mar 2024 07:12  Phos  4.5     03-24  Mg     2.0     03-24    TPro  7.2  /  Alb  3.6  /  TBili  0.8  /  DBili  x   /  AST  17  /  ALT  7<L>  /  AlkPhos  93  03-23    PT/INR - ( 23 Mar 2024 19:21 )   PT: 29.9 sec;   INR: 2.80 ratio         PTT - ( 23 Mar 2024 19:21 )  PTT:39.7 sec  Urinalysis Basic - ( 24 Mar 2024 07:12 )    Color: x / Appearance: x / SG: x / pH: x  Gluc: 116 mg/dL / Ketone: x  / Bili: x / Urobili: x   Blood: x / Protein: x / Nitrite: x   Leuk Esterase: x / RBC: x / WBC x   Sq Epi: x / Non Sq Epi: x / Bacteria: x             INTERVAL HPI/OVERNIGHT EVENTS:  Pt seen and examined at bedside.     Allergies/Intolerance: codeine (Nausea)      MEDICATIONS  (STANDING):  apixaban 5 milliGRAM(s) Oral every 12 hours  atenolol  Tablet 25 milliGRAM(s) Oral every 12 hours  lidocaine   4% Patch 1 Patch Transdermal daily  sertraline 50 milliGRAM(s) Oral daily    MEDICATIONS  (PRN):  acetaminophen     Tablet .. 975 milliGRAM(s) Oral every 6 hours PRN Temp greater or equal to 38C (100.4F), Moderate Pain (4 - 6)  diazepam    Tablet 2.5 milliGRAM(s) Oral daily PRN anxiety        ROS: all systems reviewed and wnl      PHYSICAL EXAMINATION:  Vital Signs Last 24 Hrs  T(C): 36.3 (24 Mar 2024 04:59), Max: 36.6 (23 Mar 2024 18:46)  T(F): 97.3 (24 Mar 2024 04:59), Max: 97.8 (23 Mar 2024 18:46)  HR: 72 (24 Mar 2024 04:59) (66 - 72)  BP: 129/73 (24 Mar 2024 04:59) (108/68 - 129/73)  BP(mean): 77 (23 Mar 2024 21:57) (76 - 77)  RR: 18 (24 Mar 2024 04:59) (16 - 20)  SpO2: 95% (24 Mar 2024 04:59) (95% - 99%)    Parameters below as of 24 Mar 2024 04:59  Patient On (Oxygen Delivery Method): nasal cannula  O2 Flow (L/min): 2    CAPILLARY BLOOD GLUCOSE          03-23 @ 07:01  -  03-24 @ 07:00  --------------------------------------------------------  IN: 250 mL / OUT: 0 mL / NET: 250 mL        GENERAL: stable, no fevers or CP  NECK: supple, No JVD  CHEST/LUNG: clear to auscultation bilaterally; no rales, rhonchi, or wheezing b/l  HEART: normal S1, S2  ABDOMEN: BS+, soft, ND, NT   EXTREMITIES:  pulses palpable; no clubbing, cyanosis, or edema b/l LEs    LABS:                        9.4    7.72  )-----------( 339      ( 24 Mar 2024 07:12 )             31.5     03-24    142  |  102  |  22  ----------------------------<  116<H>  3.3<L>   |  24  |  1.03    Ca    9.1      24 Mar 2024 07:12  Phos  4.5     03-24  Mg     2.0     03-24    TPro  7.2  /  Alb  3.6  /  TBili  0.8  /  DBili  x   /  AST  17  /  ALT  7<L>  /  AlkPhos  93  03-23    PT/INR - ( 23 Mar 2024 19:21 )   PT: 29.9 sec;   INR: 2.80 ratio         PTT - ( 23 Mar 2024 19:21 )  PTT:39.7 sec  Urinalysis Basic - ( 24 Mar 2024 07:12 )    Color: x / Appearance: x / SG: x / pH: x  Gluc: 116 mg/dL / Ketone: x  / Bili: x / Urobili: x   Blood: x / Protein: x / Nitrite: x   Leuk Esterase: x / RBC: x / WBC x   Sq Epi: x / Non Sq Epi: x / Bacteria: x

## 2024-03-24 NOTE — DISCHARGE NOTE PROVIDER - HOSPITAL COURSE
HPI:  83 year old female with hx of Afib on Eliquis, HTN, GERD, Breast cancer in remission, anxiety, OA, and lymphedema presenting after a fall. Patient's son at bedside who provided additional collateral. Patient has been having several falls at home lately. She currently lives alone but has a  that comes occasionally. She has been independent and walks unassisted most of the time. Over the last few months, son has felt that his mother's functional status has declined. She has been having more falls, appears to lose balance more frequently, and has some cognitive changes. Son witnessed the patient fall yesterday afternoon when he came to visit her at her home. Pt suddenly fell forward and landed on her shoulder and hit her R head. No LOC. Pt attributed the event from her lack of sleep lately. She is endorsing some lower back pain but currently without any other symptoms. Pt also had a fall the day prior and was unable to get back up due to her bad knees. She stayed on the floor for a few hours but was able to crawl around until help came. Pt denied any chest pain, abdominal pain, urinary retention, nausea, vomiting, dizziness or lightheadedness. Has some urinary incontinence which she says is not new. Also some dyspnea on exertion. She currently receives outpatient physical therapy twice a week. Per son, pt was hypoxic when EMS arrived and was placed on nasal cannula. Pt does not usually use oxygen at home.     In the ED, patient received tylenol for pain and K repletion. CT imaging notable for acute appearing fracture of anterior superior endplate of L1. Pt admitted to medicine for further workup.  (23 Mar 2024 23:46)    Hospital Course:  Pt was admitted with pain control. Ortho consulted with no acute intervention.     CXR revealing  MRI     Important Medication Changes and Reason:    Active or Pending Issues Requiring Follow-up:    Advanced Directives:   [ ] Full code  [ ] DNR  [ ] Hospice    Discharge Diagnoses:         HPI:  83 year old female with hx of Afib on Eliquis, HTN, GERD, Breast cancer in remission, anxiety, OA, and lymphedema presenting after a fall. Patient's son at bedside who provided additional collateral. Patient has been having several falls at home lately. She currently lives alone but has a  that comes occasionally. She has been independent and walks unassisted most of the time. Over the last few months, son has felt that his mother's functional status has declined. She has been having more falls, appears to lose balance more frequently, and has some cognitive changes. Son witnessed the patient fall yesterday afternoon when he came to visit her at her home. Pt suddenly fell forward and landed on her shoulder and hit her R head. No LOC. Pt attributed the event from her lack of sleep lately. She is endorsing some lower back pain but currently without any other symptoms. Pt also had a fall the day prior and was unable to get back up due to her bad knees. She stayed on the floor for a few hours but was able to crawl around until help came. Pt denied any chest pain, abdominal pain, urinary retention, nausea, vomiting, dizziness or lightheadedness. Has some urinary incontinence which she says is not new. Also some dyspnea on exertion. She currently receives outpatient physical therapy twice a week. Per son, pt was hypoxic when EMS arrived and was placed on nasal cannula. Pt does not usually use oxygen at home.     In the ED, patient received tylenol for pain and K repletion. CT imaging notable for acute appearing fracture of anterior superior endplate of L1. Pt admitted to medicine for further workup.  (23 Mar 2024 23:46)    Hospital Course:  Pt was admitted with pain control. Ortho consulted with no acute intervention.     UA  Echo  CXR revealing  MRI     Important Medication Changes and Reason:    Active or Pending Issues Requiring Follow-up:    Advanced Directives:   [ ] Full code  [ ] DNR  [ ] Hospice    Discharge Diagnoses:  - Syncope        HPI:  83 year old female with hx of Afib on Eliquis, HTN, GERD, Breast cancer in remission, anxiety, OA, and lymphedema presenting after a fall. Patient's son at bedside who provided additional collateral. Patient has been having several falls at home lately. She currently lives alone but has a  that comes occasionally. She has been independent and walks unassisted most of the time. Over the last few months, son has felt that his mother's functional status has declined. She has been having more falls, appears to lose balance more frequently, and has some cognitive changes. Son witnessed the patient fall yesterday afternoon when he came to visit her at her home. Pt suddenly fell forward and landed on her shoulder and hit her R head. No LOC. Pt attributed the event from her lack of sleep lately. She is endorsing some lower back pain but currently without any other symptoms. Pt also had a fall the day prior and was unable to get back up due to her bad knees. She stayed on the floor for a few hours but was able to crawl around until help came. Pt denied any chest pain, abdominal pain, urinary retention, nausea, vomiting, dizziness or lightheadedness. Has some urinary incontinence which she says is not new. Also some dyspnea on exertion. She currently receives outpatient physical therapy twice a week. Per son, pt was hypoxic when EMS arrived and was placed on nasal cannula. Pt does not usually use oxygen at home.     In the ED, patient received tylenol for pain and K repletion. CT imaging notable for acute appearing fracture of anterior superior endplate of L1. Pt admitted to medicine for further workup.  (23 Mar 2024 23:46)    Hospital Course:  Pt was admitted with pain control. Ortho consulted with no acute intervention. TSLO brace was fitted and worked with PT who recommended LISET.   Pt did have continued hematuria during admission which was thought to be chronic.   Pt was largely asymptomatic, but did have some mild dysuria and mild hydronephrosis noted on abd imaging.  Pt also had Hgb decrease, but stabilized around 8 from baseline or 9-10, likely 2/2 hematuria.  Abd CT performed w/o evidence of hematoma. UA was ordered and was + for pyuria, blood, and nitrites/LE pending U cultures.  Pt was started on empiric zosyn and consulted outpt urologist for guidance regarding UTI iso renal stones w/ hydronephrosis.  U cultures revealed  Pt's eliquis was held until Hgb was stabiolized and RP bleed was ruled out and restarted w/o further drop.   Echo with mod pulmHTN and EF 76%, consistent with prior echo from OSH in 2022. CXR revealing bl atelecasis and pleual effusions.    Important Medication Changes and Reason:    Active or Pending Issues Requiring Follow-up:    Advanced Directives:   [ ] Full code  [ ] DNR  [ ] Hospice    Discharge Diagnoses:  - Syncope        HPI:  83 year old female with hx of Afib on Eliquis, HTN, GERD, Breast cancer in remission, anxiety, OA, and lymphedema presenting after a fall. Patient's son at bedside who provided additional collateral. Patient has been having several falls at home lately. She currently lives alone but has a  that comes occasionally. She has been independent and walks unassisted most of the time. Over the last few months, son has felt that his mother's functional status has declined. She has been having more falls, appears to lose balance more frequently, and has some cognitive changes. Son witnessed the patient fall yesterday afternoon when he came to visit her at her home. Pt suddenly fell forward and landed on her shoulder and hit her R head. No LOC. Pt attributed the event from her lack of sleep lately. She is endorsing some lower back pain but currently without any other symptoms. Pt also had a fall the day prior and was unable to get back up due to her bad knees. She stayed on the floor for a few hours but was able to crawl around until help came. Pt denied any chest pain, abdominal pain, urinary retention, nausea, vomiting, dizziness or lightheadedness. Has some urinary incontinence which she says is not new. Also some dyspnea on exertion. She currently receives outpatient physical therapy twice a week. Per son, pt was hypoxic when EMS arrived and was placed on nasal cannula. Pt does not usually use oxygen at home.     In the ED, patient received tylenol for pain and K repletion. CT imaging notable for acute appearing fracture of anterior superior endplate of L1. Pt admitted to medicine for further workup.  (23 Mar 2024 23:46)    Hospital Course:  Pt was admitted with pain control. Ortho consulted with no acute intervention. TSLO brace was fitted and worked with PT who recommended LISET.   Pt did have continued hematuria during admission which was thought to be chronic.   Pt was largely asymptomatic, but did have some mild dysuria and mild hydronephrosis noted on abd imaging.  Pt also had Hgb decrease, but stabilized around 8 from baseline or 9-10, likely 2/2 hematuria.  Abd CT performed w/o evidence of hematoma. UA was ordered and was + for pyuria, blood, and nitrites/LE pending U cultures.  Pt was started on empiric zosyn and consulted outpt urologist for guidance regarding UTI iso renal stones w/ hydronephrosis.  U cultures revealed ESBL, on invanz through 4/1/24.  Pt's eliquis was held until Hgb was stabilized and RP bleed was ruled out and restarted w/o further drop. f/u GI outpatient.  Echo with mod pulm HTN and EF 76%, consistent with prior echo from OSH in 2022. CXR revealing bl atelecasis and pleual effusions.    Discharge planning discussed with attending Dr Oneil. Patient is medically cleared and stable for discharge. Medication Reconciliation reviewed with attending. Follow up outpatient with your PCP.    Important Medication Changes and Reason:  invanz through 4/1/24  PPI  ferrous sulfate    Active or Pending Issues Requiring Follow-up: PCP, urology, GI, ortho, cardio    Advanced Directives:   [ x] Full code  [ ] DNR  [ ] Hospice    Discharge Diagnoses:  - Syncope   - Vertebra Fx  - Hematuria  - ESBL UTI  - Hypokalemia  - Anemia         83 year old female PMH of Afib on Eliquis, HTN, GERD, Breast cancer in remission, anxiety, OA, and left arm chronic lymphedema presenting after a fall. Patient's son at bedside who provided additional collateral. Patient has been having several falls at home lately. She currently lives alone but has a  that comes occasionally. She has been independent and walks unassisted most of the time. Over the last few months, son has felt that his mother's functional status has declined. She has been having more falls, appears to lose balance more frequently, and has some cognitive changes. Son witnessed the patient fall yesterday afternoon when he came to visit her at her home. Pt suddenly fell forward and landed on her shoulder and hit her R head. No LOC. Pt attributed the event from her lack of sleep lately. She is endorsing some lower back pain but currently without any other symptoms. Pt also had a fall the day prior and was unable to get back up due to her bad knees. She stayed on the floor for a few hours but was able to crawl around until help came.   In the ED, patient received tylenol for pain and K repletion. CT imaging notable for acute appearing fracture of anterior superior endplate of L1. Pt admitted to medicine for further workup.      Hospital Course: Was admitted with pain control. Ortho consulted with no acute intervention. TSLO brace was fitted, she worked well with PT who recommended LISET.   Pt did have continued hematuria during admission which was thought to be chronic. Pt was largely asymptomatic, but did have some mild dysuria and mild hydronephrosis noted on abd imaging. ESBL was found and treated with Invancz.  EKG here notes chronic A.fib, rate controlled.     Had Hgb decrease, but stabilized around 8.7 to 9.1. Urine cultures revealed ESBL, will be on invanz through 4/1/24.  Pt's Eliquis was held until Hgb was stabilized and RP bleed was ruled out and restarted w/o further drop. GI consultant agree with outpatient eval. TTE notes mod pulm HTN and EF 76%, consistent with prior echo from OSH in 2022. CXR revealing bl atelecasis and pleual effusions. We added low dose Lasix here.     Discharge planning discussed with attending Dr Oneil. Patient is medically cleared and stable for discharge. Medication Reconciliation reviewed with attending. Follow up outpatient with your PCP one week after discharge from rehab. See med list and EKG.   83 year old female PMH of Afib on Eliquis, HTN, GERD, Breast cancer in remission, anxiety, OA, and left arm chronic lymphedema presenting after a fall. Patient's son at bedside who provided additional collateral. Patient has been having several falls at home lately. She currently lives alone but has a  that comes occasionally. She has been independent and walks unassisted most of the time. Over the last few months, son has felt that his mother's functional status has declined. She has been having more falls, appears to lose balance more frequently, and has some cognitive changes. Son witnessed the patient fall yesterday afternoon when he came to visit her at her home. Pt suddenly fell forward and landed on her shoulder and hit her R head. No LOC. Pt attributed the event from her lack of sleep lately. She is endorsing some lower back pain but currently without any other symptoms. Pt also had a fall the day prior and was unable to get back up due to her bad knees. She stayed on the floor for a few hours but was able to crawl around until help came.   In the ED, patient received tylenol for pain and K repletion. CT imaging notable for acute appearing fracture of anterior superior endplate of L1. Pt admitted to medicine for further workup.  Was admitted with pain control. Ortho consulted with no acute surgical intervention. TSLO brace was fitted, she worked well with PT who recommended LISET.   Pt did have continued hematuria during admission which was thought to be chronic. Pt was largely asymptomatic, but did have some mild dysuria and mild hydronephrosis noted on abd imaging. ESBL was found and treated with Invancz.  EKG here notes chronic A.fib, rate controlled.     Had Hgb decrease, but stabilized around 8.7 to 9.1. Urine cultures revealed ESBL, will be on Invanz through 4/1/24.  Pt's Eliquis was held until Hgb was stabilized and RP bleed was ruled out and restarted w/o further drop. GI consultant agree with outpatient eval. TTE notes mod pulm HTN and EF 76%, consistent with prior echo from OSH in 2022. CXR revealing bl atelecasis and pleual effusions. We added low dose Lasix here.     Discharge planning discussed with attending Dr Oneil. Patient is medically cleared and stable for discharge. Medication Reconciliation reviewed with attending. Follow up outpatient with your PCP one week after discharge from rehab. See med list and EKG. Discharged to rehab.

## 2024-03-24 NOTE — DISCHARGE NOTE PROVIDER - NSDCFUADDAPPT_GEN_ALL_CORE_FT
APPTS ARE READY TO BE MADE: [ ] YES    Best Family or Patient Contact (if needed):    Additional Information about above appointments (if needed):    1: PCP  2: Orthopedics - Dr. Buchanan in 1 week, please call office for appt    Other comments or requests:  APPTS ARE READY TO BE MADE: [x] YES    Best Family or Patient Contact (if needed):    Additional Information about above appointments (if needed):    1: PCP  2: Orthopedics - Dr. Buchanan in 1 week, please call office for appt  3: Cards (has appt w/ [facility] provider)   4: Urology 1-2 weeks (Dr. Muhammad or [facility] [phone])    Other comments or requests:  APPTS ARE READY TO BE MADE: [x] YES    Best Family or Patient Contact (if needed):    Additional Information about above appointments (if needed):    1: PCP  2: Orthopedics - Dr. Buchanan in 1 week, please call office for appt  3: Cards (has appt w/ [facility] provider)   4: Urology 1-2 weeks (Dr. Muhammad or [facility] [phone])  5. GI    Other comments or requests:  APPTS ARE READY TO BE MADE: [x] YES    Best Family or Patient Contact (if needed):    Additional Information about above appointments (if needed):    1: PCP  2: Orthopedics - Dr. Buchanan in 1 week, please call office for appt  3: Cards (has appt w/ [facility] provider)   4: Urology 1-2 weeks (Dr. Muhammad or [facility] [phone])  5. GI    Other comments or requests:   Patient is being discharged to rehab. Patient/caregiver will arrange follow up appointments.

## 2024-03-24 NOTE — CHART NOTE - NSCHARTNOTEFT_GEN_A_CORE
Patient was measured and dispensed a TLSO rigid posterior panel extending from the sacrococcygeal junction to the scapular spine with a soft apron front. The TLSO will stabilize and control the spine reduce pain and ROM and assist in the healing process. Care use and function were explained to the patient. Contact info was given. All went without incident   [facility]  [phone]
Search Terms: [place] Raymundo, 1940  Search Date: 03/24/2024 02:53:19 AM  Searching on behalf of: [address] [place] [address]  The Drug Utilization Report below displays all of the controlled substance prescriptions, if any, that your patient has filled in the last twelve months. The information displayed on this report is compiled from pharmacy submissions to the Department, and accurately reflects the information as submitted by the pharmacies.    This report was requested by: Teofilo Boykin | Reference #: [id number]    Practitioner Count: 1  Pharmacy Count: 1  Current Opioid Prescriptions: 0  Current Benzodiazepine Prescriptions: 1  Current Stimulant Prescriptions: 0      Patient Demographic Information (PDI)       PDI	First Name	Last Name	Birth Date	Gender	Street Address	City	State	Zip Code  A	Africa Raymundo	1940	Female	[address] [place] [address]    Prescription Information      PDI Filter:    PDI	Current Rx	Drug Type	Rx Written	Rx Dispensed	Drug	Quantity	Days Supply	Prescriber Name	Prescriber MELA #	Payment Method	Dispenser  A	Y	B	03/19/2024	03/21/2024	diazepam 5 mg tablet	30	30	Cheryl Lora MD	[id number]	Medicare	[id number]  A	N	B	01/17/2024	01/22/2024	diazepam 5 mg tablet	30	30	Cheryl Lora MD	[id number]	Medicare	[id number]  A	N	B	11/21/2023	11/24/2023	diazepam 5 mg tablet	30	30	BarricellCheryl fine MD	[id number]	Medicare	[id number]  A	N	B	09/22/2023	09/24/2023	diazepam 5 mg tablet	30	30	Melissa Thompson	[id number]	Medicare	[id number]  A	N	B	07/24/2023	07/24/2023	diazepam 5 mg tablet	30	30	Cheryl Lora MD	[id number]	Medicare	[id number]  A	N	B	05/24/2023	05/27/2023	diazepam 5 mg tablet	30	30	Cheryl Lora MD	[id number]	Medicare	[id number]  A	N	B	03/28/2023	03/29/2023	diazepam 5 mg tablet	30	30	Cheryl Lora MD	[id number]	Medicare	[id number]

## 2024-03-24 NOTE — PROGRESS NOTE ADULT - ASSESSMENT
83 year old female with hx of Afib on Eliquis, HTN, GERD, Breast cancer in remission, anxiety, OA, and lymphedema presenting after a fall. Pt with acute fracture of L1 anterior superior endplate. Pt admitted for further management.  83 year old female with hx of Afib on Eliquis, HTN, GERD, Breast cancer in remission, anxiety, OA, and lymphedema presenting after a fall.     Pt with acute fracture of L1 anterior superior endplate. Pt admitted for further management.     Follow up CT of thoraxic ordered by admitting team.     Continue home meds: Eliquis 5 mg bid for chronic A.fib and Atenolol 25 mg bid for rate control.     Continue home Zoloft 50 mg/day.     PT eval, pain control.     No active infections.   83 year old female with hx of Afib on Eliquis, HTN, GERD, Breast cancer in remission, anxiety, OA, and lymphedema presenting after a fall.       Pt with acute fracture of L1 anterior superior endplate. Pt admitted for further management. Likely needs MRI of LS spine.      Follow up CT of thoraxic ordered by admitting team. L-1 fracture noted on admission CT.     CT head no acute findings, CT neck no fracture.  Ortho following.     Continue home meds: Eliquis 5 mg bid for chronic A.fib and Atenolol 25 mg bid for rate control.     Continue home Zoloft 50 mg/day.     PT eval, pain control.     No active infections.

## 2024-03-24 NOTE — PHYSICAL THERAPY INITIAL EVALUATION ADULT - GENERAL OBSERVATIONS, REHAB EVAL
pt received semi-supine on stretcher in ED, +2L 02 NC, +tele, A&0x4, following 100% multi-step commands, son at bedside

## 2024-03-24 NOTE — DISCHARGE NOTE PROVIDER - NSDCCPTREATMENT_GEN_ALL_CORE_FT
PRINCIPAL PROCEDURE  Procedure: CT lumbar spine  Findings and Treatment: Acute appearing fracture of the anterior superior endplate of L1   involving the anterior bridging osteophyte and posteriorly the right   pedicle into the superior facet. No associated facet widening or   significant prevertebral body height loss. Age-indeterminate fracture   right anterior bridging marginal osteophyte L2/L3     PRINCIPAL PROCEDURE  Procedure: CT lumbar spine  Findings and Treatment: Acute appearing fracture of the anterior superior endplate of L1   involving the anterior bridging osteophyte and posteriorly the right   pedicle into the superior facet. No associated facet widening or   significant prevertebral body height loss. Age-indeterminate fracture   right anterior bridging marginal osteophyte L2/L3      SECONDARY PROCEDURE  Procedure: Transthoracic echocardiography (TTE)  Findings and Treatment:      PRINCIPAL PROCEDURE  Procedure: CT lumbar spine  Findings and Treatment: Acute appearing fracture of the anterior superior endplate of L1   involving the anterior bridging osteophyte and posteriorly the right   pedicle into the superior facet. No associated facet widening or   significant prevertebral body height loss. Age-indeterminate fracture   right anterior bridging marginal osteophyte L2/L3      SECONDARY PROCEDURE  Procedure: Transthoracic echocardiography (TTE)  Findings and Treatment: 1. Left ventricular cavity is normal in size. Left ventricular wall thickness is normal. Left ventricular systolic function is normal with an ejection fraction of 76 % by Monroe's method of disks. There are no regional wall motion abnormalities seen.   2. Elevated filling pressure. Analysis of left ventricular diastolic function and filling pressure is made challenging by the presence of atrial fibrillation.   3. Normal right ventricular cavity size, with normal wall thickness, and normal systolic function.   4. No pericardial effusion seen.   5. Estimated pulmonary artery systolic pressure is 51 mmHg, consistent with moderate pulmonary hypertension.   6. No prior echocardiogram is available for comparison.   7. Technically difficult image quality.   8. The inferior vena cava is dilated measuring 3.18 cm in diameter, (dilated >2.1cm) with abnormal inspiratory collapse (abnormal <50%) consistent with elevated right atrial pressure (~15, range 10-20mmHg).   9. There is normal LV mass and concentric remodeling.

## 2024-03-24 NOTE — PHYSICAL THERAPY INITIAL EVALUATION ADULT - TRANSFER TRAINING, PT EVAL
GOAL: Pt will transfer sit<->stand with supervision to improve overall ease with transfers in 2 weeks.

## 2024-03-24 NOTE — DISCHARGE NOTE PROVIDER - NSDCCPCAREPLAN_GEN_ALL_CORE_FT
PRINCIPAL DISCHARGE DIAGNOSIS  Diagnosis: Back pain  Assessment and Plan of Treatment: You were admitted to the hospital after falling and found to have an L1 fracture that was treated nonoperatively with pain control and bracing.     PRINCIPAL DISCHARGE DIAGNOSIS  Diagnosis: Back pain  Assessment and Plan of Treatment: You were admitted to the hospital after falling and found to have an L1 fracture that was treated nonoperatively with pain control and bracing. You should follow the instructions of your orthopedic surgery team closely on discharge to prevent complications. Please follow up with the outpatient clinic within 1 week of discharge with Dr. Buchanan     PRINCIPAL DISCHARGE DIAGNOSIS  Diagnosis: Back pain  Assessment and Plan of Treatment: You were admitted to the hospital after falling and found to have an L1 fracture that was treated nonoperatively with pain control and bracing. You should follow the instructions of your orthopedic surgery team closely on discharge to prevent complications. Please follow up with the outpatient clinic within 1 week of discharge with Dr. Buchanan  Please follow-up with your primary care physician within one week of discharge.      SECONDARY DISCHARGE DIAGNOSES  Diagnosis: Left renal stone  Assessment and Plan of Treatment: Urology team discussed that given stone size you should strongly consider surgical intervention with ESWL or Ureteroscopy  Please follow up in  office 2 weeks post discharge for discussion of these options    Diagnosis: Acute UTI  Assessment and Plan of Treatment: urine cultures grew ESBL E. coli  Complete a course of iv antibiotic invanz through 4/1/24    Diagnosis: Anemia  Assessment and Plan of Treatment: Low hemoglobin likely due to hematuria  Continue pantoprazole daily, start iron supplement  Outpatient GI follow-up

## 2024-03-24 NOTE — PROGRESS NOTE ADULT - PROBLEM SELECTOR PLAN 5
Hx of chronic afib on Eliquis and atenolol  - Continue Eliquis 5mg BID  - Continue atenolol 25mg BID; monitor BP and HR  - EKG reviewed showing slow Afib HR 65, qtc 480  - Monitor on telemetry on admission

## 2024-03-25 ENCOUNTER — RESULT REVIEW (OUTPATIENT)
Age: 84
End: 2024-03-25

## 2024-03-25 LAB
ALBUMIN SERPL ELPH-MCNC: 3.3 G/DL — SIGNIFICANT CHANGE UP (ref 3.3–5)
ALP SERPL-CCNC: 81 U/L — SIGNIFICANT CHANGE UP (ref 40–120)
ALT FLD-CCNC: 5 U/L — LOW (ref 10–45)
ANION GAP SERPL CALC-SCNC: 13 MMOL/L — SIGNIFICANT CHANGE UP (ref 5–17)
APTT BLD: 33.6 SEC — SIGNIFICANT CHANGE UP (ref 24.5–35.6)
AST SERPL-CCNC: 17 U/L — SIGNIFICANT CHANGE UP (ref 10–40)
BASOPHILS # BLD AUTO: 0.03 K/UL — SIGNIFICANT CHANGE UP (ref 0–0.2)
BASOPHILS NFR BLD AUTO: 0.4 % — SIGNIFICANT CHANGE UP (ref 0–2)
BILIRUB SERPL-MCNC: 0.6 MG/DL — SIGNIFICANT CHANGE UP (ref 0.2–1.2)
BUN SERPL-MCNC: 16 MG/DL — SIGNIFICANT CHANGE UP (ref 7–23)
CALCIUM SERPL-MCNC: 8.9 MG/DL — SIGNIFICANT CHANGE UP (ref 8.4–10.5)
CHLORIDE SERPL-SCNC: 105 MMOL/L — SIGNIFICANT CHANGE UP (ref 96–108)
CO2 SERPL-SCNC: 24 MMOL/L — SIGNIFICANT CHANGE UP (ref 22–31)
CREAT SERPL-MCNC: 0.74 MG/DL — SIGNIFICANT CHANGE UP (ref 0.5–1.3)
EGFR: 80 ML/MIN/1.73M2 — SIGNIFICANT CHANGE UP
EOSINOPHIL # BLD AUTO: 0.25 K/UL — SIGNIFICANT CHANGE UP (ref 0–0.5)
EOSINOPHIL NFR BLD AUTO: 3.2 % — SIGNIFICANT CHANGE UP (ref 0–6)
GLUCOSE SERPL-MCNC: 131 MG/DL — HIGH (ref 70–99)
HCT VFR BLD CALC: 30.2 % — LOW (ref 34.5–45)
HGB BLD-MCNC: 9 G/DL — LOW (ref 11.5–15.5)
IMM GRANULOCYTES NFR BLD AUTO: 0.4 % — SIGNIFICANT CHANGE UP (ref 0–0.9)
INR BLD: 1.98 RATIO — HIGH (ref 0.85–1.18)
LYMPHOCYTES # BLD AUTO: 1.21 K/UL — SIGNIFICANT CHANGE UP (ref 1–3.3)
LYMPHOCYTES # BLD AUTO: 15.6 % — SIGNIFICANT CHANGE UP (ref 13–44)
MAGNESIUM SERPL-MCNC: 1.9 MG/DL — SIGNIFICANT CHANGE UP (ref 1.6–2.6)
MCHC RBC-ENTMCNC: 23.6 PG — LOW (ref 27–34)
MCHC RBC-ENTMCNC: 29.8 GM/DL — LOW (ref 32–36)
MCV RBC AUTO: 79.3 FL — LOW (ref 80–100)
MONOCYTES # BLD AUTO: 0.61 K/UL — SIGNIFICANT CHANGE UP (ref 0–0.9)
MONOCYTES NFR BLD AUTO: 7.9 % — SIGNIFICANT CHANGE UP (ref 2–14)
NEUTROPHILS # BLD AUTO: 5.62 K/UL — SIGNIFICANT CHANGE UP (ref 1.8–7.4)
NEUTROPHILS NFR BLD AUTO: 72.5 % — SIGNIFICANT CHANGE UP (ref 43–77)
NRBC # BLD: 0 /100 WBCS — SIGNIFICANT CHANGE UP (ref 0–0)
PHOSPHATE SERPL-MCNC: 3.1 MG/DL — SIGNIFICANT CHANGE UP (ref 2.5–4.5)
PLATELET # BLD AUTO: 304 K/UL — SIGNIFICANT CHANGE UP (ref 150–400)
POTASSIUM SERPL-MCNC: 3.9 MMOL/L — SIGNIFICANT CHANGE UP (ref 3.5–5.3)
POTASSIUM SERPL-SCNC: 3.9 MMOL/L — SIGNIFICANT CHANGE UP (ref 3.5–5.3)
PROT SERPL-MCNC: 6.6 G/DL — SIGNIFICANT CHANGE UP (ref 6–8.3)
PROTHROM AB SERPL-ACNC: 21.3 SEC — HIGH (ref 9.5–13)
RBC # BLD: 3.81 M/UL — SIGNIFICANT CHANGE UP (ref 3.8–5.2)
RBC # FLD: 16.8 % — HIGH (ref 10.3–14.5)
SODIUM SERPL-SCNC: 142 MMOL/L — SIGNIFICANT CHANGE UP (ref 135–145)
WBC # BLD: 7.75 K/UL — SIGNIFICANT CHANGE UP (ref 3.8–10.5)
WBC # FLD AUTO: 7.75 K/UL — SIGNIFICANT CHANGE UP (ref 3.8–10.5)

## 2024-03-25 PROCEDURE — 93306 TTE W/DOPPLER COMPLETE: CPT | Mod: 26

## 2024-03-25 RX ORDER — SODIUM CHLORIDE 9 MG/ML
1000 INJECTION, SOLUTION INTRAVENOUS
Refills: 0 | Status: DISCONTINUED | OUTPATIENT
Start: 2024-03-25 | End: 2024-03-26

## 2024-03-25 RX ORDER — SERTRALINE 25 MG/1
1 TABLET, FILM COATED ORAL
Refills: 0 | DISCHARGE

## 2024-03-25 RX ORDER — CHLORHEXIDINE GLUCONATE 213 G/1000ML
1 SOLUTION TOPICAL DAILY
Refills: 0 | Status: DISCONTINUED | OUTPATIENT
Start: 2024-03-25 | End: 2024-03-30

## 2024-03-25 RX ORDER — METFORMIN HYDROCHLORIDE 850 MG/1
1 TABLET ORAL
Refills: 0 | DISCHARGE

## 2024-03-25 RX ORDER — ATENOLOL 25 MG/1
1 TABLET ORAL
Refills: 0 | DISCHARGE

## 2024-03-25 RX ORDER — ERGOCALCIFEROL 1.25 MG/1
0 CAPSULE ORAL
Refills: 0 | DISCHARGE

## 2024-03-25 RX ORDER — DIAZEPAM 5 MG
0.5 TABLET ORAL
Refills: 0 | DISCHARGE

## 2024-03-25 RX ORDER — APIXABAN 2.5 MG/1
1 TABLET, FILM COATED ORAL
Refills: 0 | DISCHARGE

## 2024-03-25 RX ADMIN — SODIUM CHLORIDE 100 MILLILITER(S): 9 INJECTION, SOLUTION INTRAVENOUS at 11:07

## 2024-03-25 RX ADMIN — SODIUM CHLORIDE 100 MILLILITER(S): 9 INJECTION, SOLUTION INTRAVENOUS at 20:15

## 2024-03-25 RX ADMIN — ATENOLOL 25 MILLIGRAM(S): 25 TABLET ORAL at 05:24

## 2024-03-25 RX ADMIN — LIDOCAINE 1 PATCH: 4 CREAM TOPICAL at 21:15

## 2024-03-25 RX ADMIN — LIDOCAINE 1 PATCH: 4 CREAM TOPICAL at 05:41

## 2024-03-25 RX ADMIN — APIXABAN 5 MILLIGRAM(S): 2.5 TABLET, FILM COATED ORAL at 05:24

## 2024-03-25 RX ADMIN — LIDOCAINE 1 PATCH: 4 CREAM TOPICAL at 15:15

## 2024-03-25 RX ADMIN — APIXABAN 5 MILLIGRAM(S): 2.5 TABLET, FILM COATED ORAL at 17:01

## 2024-03-25 RX ADMIN — SERTRALINE 50 MILLIGRAM(S): 25 TABLET, FILM COATED ORAL at 11:09

## 2024-03-25 RX ADMIN — CHLORHEXIDINE GLUCONATE 1 APPLICATION(S): 213 SOLUTION TOPICAL at 17:01

## 2024-03-25 NOTE — PROGRESS NOTE ADULT - SUBJECTIVE AND OBJECTIVE BOX
PROGRESS NOTE:   Authored by Dr. Gatito Vargas MD (PGY-1)    Patient is a 83y old  Female who presents with a chief complaint of Fall (24 Mar 2024 14:48)      SUBJECTIVE / OVERNIGHT EVENTS:  No acute events overnight.       MEDICATIONS  (STANDING):  apixaban 5 milliGRAM(s) Oral every 12 hours  atenolol  Tablet 25 milliGRAM(s) Oral every 12 hours  lidocaine   4% Patch 1 Patch Transdermal daily  polyethylene glycol 3350 17 Gram(s) Oral two times a day  sertraline 50 milliGRAM(s) Oral daily    MEDICATIONS  (PRN):  acetaminophen     Tablet .. 975 milliGRAM(s) Oral every 6 hours PRN Temp greater or equal to 38C (100.4F), Moderate Pain (4 - 6)  diazepam    Tablet 2.5 milliGRAM(s) Oral daily PRN anxiety      CAPILLARY BLOOD GLUCOSE        I&O's Summary    24 Mar 2024 07:01  -  25 Mar 2024 07:00  --------------------------------------------------------  IN: 250 mL / OUT: 0 mL / NET: 250 mL        PHYSICAL EXAM:  Vital Signs Last 24 Hrs  T(C): 36.9 (25 Mar 2024 05:03), Max: 36.9 (25 Mar 2024 05:03)  T(F): 98.4 (25 Mar 2024 05:03), Max: 98.4 (25 Mar 2024 05:03)  HR: 75 (25 Mar 2024 05:03) (70 - 92)  BP: 102/64 (25 Mar 2024 05:03) (92/65 - 124/65)  BP(mean): --  RR: 18 (25 Mar 2024 05:03) (18 - 20)  SpO2: 94% (25 Mar 2024 05:03) (94% - 98%)    Parameters below as of 25 Mar 2024 05:03  Patient On (Oxygen Delivery Method): nasal cannula  O2 Flow (L/min): 2        CONSTITUTIONAL: Well-groomed, in no apparent distress  EYES: No conjunctival or scleral injection, EOM grossly intact and symmetric  ENMT: MMM  RESPIRATORY: Breathing comfortably; lungs CTA without wheeze/rhonchi/rales  CARDIOVASCULAR: +S1S2, RRR, no M/G/R; 1+ lower extremity edema, equal  GASTROINTESTINAL: No palpable masses or tenderness, +BS throughout, no rebound/guarding; no hepatosplenomegaly; no hernia palpated  MUSCULOSKELETAL: No digital clubbing or cyanosis; no no cervical tenderness; low back tenderness on palpation; no malalignment of extremities  SKIN: small laceration in R lateral forehead with hemostasis, no active bleeding. RUE chronic lymphedema   NEUROLOGIC: CN grossly intact; sensation intact in LEs b/l to light touch; normal strength and tone in UE/LE bl  PSYCHIATRIC: A+O x 3; mood and affect appropriate; appropriate insight and judgment    LABS:                        9.0    7.75  )-----------( 304      ( 25 Mar 2024 06:21 )             30.2     03-25    142  |  105  |  16  ----------------------------<  131<H>  3.9   |  24  |  0.74    Ca    8.9      25 Mar 2024 06:21  Phos  3.1     03-25  Mg     1.9     03-25    TPro  6.6  /  Alb  3.3  /  TBili  0.6  /  DBili  x   /  AST  17  /  ALT  5<L>  /  AlkPhos  81  03-25    PT/INR - ( 25 Mar 2024 06:21 )   PT: 21.3 sec;   INR: 1.98 ratio         PTT - ( 25 Mar 2024 06:21 )  PTT:33.6 sec  CARDIAC MARKERS ( 24 Mar 2024 07:12 )  x     / x     / 236 U/L / x     / x      CARDIAC MARKERS ( 23 Mar 2024 19:21 )  x     / x     / 257 U/L / x     / 3.4 ng/mL      Urinalysis Basic - ( 25 Mar 2024 06:21 )    Color: x / Appearance: x / SG: x / pH: x  Gluc: 131 mg/dL / Ketone: x  / Bili: x / Urobili: x   Blood: x / Protein: x / Nitrite: x   Leuk Esterase: x / RBC: x / WBC x   Sq Epi: x / Non Sq Epi: x / Bacteria: x          Tele Reviewed:    RADIOLOGY & ADDITIONAL TESTS:  Results Reviewed:   Imaging Personally Reviewed:  Electrocardiogram Personally Reviewed:     PROGRESS NOTE:   Authored by Dr. Gatito Vargas MD (PGY-1)    Patient is a 83y old  Female who presents with a chief complaint of Fall (24 Mar 2024 14:48)      SUBJECTIVE / OVERNIGHT EVENTS:  No acute events overnight. Pt reports mild SOB improved with NC. Denies CP/palpitations or other symptoms/concerns at this time.      MEDICATIONS  (STANDING):  apixaban 5 milliGRAM(s) Oral every 12 hours  atenolol  Tablet 25 milliGRAM(s) Oral every 12 hours  lidocaine   4% Patch 1 Patch Transdermal daily  polyethylene glycol 3350 17 Gram(s) Oral two times a day  sertraline 50 milliGRAM(s) Oral daily    MEDICATIONS  (PRN):  acetaminophen     Tablet .. 975 milliGRAM(s) Oral every 6 hours PRN Temp greater or equal to 38C (100.4F), Moderate Pain (4 - 6)  diazepam    Tablet 2.5 milliGRAM(s) Oral daily PRN anxiety      CAPILLARY BLOOD GLUCOSE        I&O's Summary    24 Mar 2024 07:01  -  25 Mar 2024 07:00  --------------------------------------------------------  IN: 250 mL / OUT: 0 mL / NET: 250 mL        PHYSICAL EXAM:  Vital Signs Last 24 Hrs  T(C): 36.9 (25 Mar 2024 05:03), Max: 36.9 (25 Mar 2024 05:03)  T(F): 98.4 (25 Mar 2024 05:03), Max: 98.4 (25 Mar 2024 05:03)  HR: 75 (25 Mar 2024 05:03) (70 - 92)  BP: 102/64 (25 Mar 2024 05:03) (92/65 - 124/65)  BP(mean): --  RR: 18 (25 Mar 2024 05:03) (18 - 20)  SpO2: 94% (25 Mar 2024 05:03) (94% - 98%)    Parameters below as of 25 Mar 2024 05:03  Patient On (Oxygen Delivery Method): nasal cannula  O2 Flow (L/min): 2        CONSTITUTIONAL: Well-groomed, in no apparent distress  EYES: No conjunctival or scleral injection, EOM grossly intact and symmetric  ENMT: MMM  RESPIRATORY: Breathing comfortably; lungs CTA without wheeze/rhonchi/rales  CARDIOVASCULAR: +S1S2, RRR, no M/G/R; 1+ lower extremity edema, equal  GASTROINTESTINAL: No palpable masses or tenderness, +BS throughout, no rebound/guarding; no hepatosplenomegaly; no hernia palpated  MUSCULOSKELETAL: No digital clubbing or cyanosis; no cervical tenderness; low back tenderness on palpation; no malalignment of extremities  SKIN: small laceration in R lateral forehead with hemostasis, no active bleeding. unilateral UE chronic lymphedema, unchanged   NEUROLOGIC: CN grossly intact; sensation intact in LEs b/l to light touch; normal strength and tone in UE/LE bl  PSYCHIATRIC: A+O x 3; mood and affect appropriate; appropriate insight and judgment    LABS:                        9.0    7.75  )-----------( 304      ( 25 Mar 2024 06:21 )             30.2     03-25    142  |  105  |  16  ----------------------------<  131<H>  3.9   |  24  |  0.74    Ca    8.9      25 Mar 2024 06:21  Phos  3.1     03-25  Mg     1.9     03-25    TPro  6.6  /  Alb  3.3  /  TBili  0.6  /  DBili  x   /  AST  17  /  ALT  5<L>  /  AlkPhos  81  03-25    PT/INR - ( 25 Mar 2024 06:21 )   PT: 21.3 sec;   INR: 1.98 ratio         PTT - ( 25 Mar 2024 06:21 )  PTT:33.6 sec  CARDIAC MARKERS ( 24 Mar 2024 07:12 )  x     / x     / 236 U/L / x     / x      CARDIAC MARKERS ( 23 Mar 2024 19:21 )  x     / x     / 257 U/L / x     / 3.4 ng/mL      Urinalysis Basic - ( 25 Mar 2024 06:21 )    Color: x / Appearance: x / SG: x / pH: x  Gluc: 131 mg/dL / Ketone: x  / Bili: x / Urobili: x   Blood: x / Protein: x / Nitrite: x   Leuk Esterase: x / RBC: x / WBC x   Sq Epi: x / Non Sq Epi: x / Bacteria: x          Tele Reviewed:    RADIOLOGY & ADDITIONAL TESTS:  Results Reviewed:   Imaging Personally Reviewed:  Electrocardiogram Personally Reviewed:

## 2024-03-25 NOTE — PROGRESS NOTE ADULT - PROBLEM SELECTOR PLAN 6
DVT ppx: on Eliquis  Diet: DASH  Dispo: pending, PT eval    Per son, multiple falls at home. Pt used to be independent and was driving up until a few months ago. However son feels patient may now need more assistance and at home and would like to consider options. Would like to speak with case management.     Patient's son Keshav Raymundo updated 3/24/24 early AM DVT ppx: on Eliquis 5 BID   Diet: DASH  Dispo: pending, PT eval repeat after obtaining TSLO brace     Per son, multiple falls at home. Pt used to be independent and was driving up until a few months ago. However son feels patient may now need more assistance and at home and would like to consider options. Would like to speak with case management.     Patient's son Keshav Raymundo updated 3/24/24 early AM and 3/25 PM as well

## 2024-03-25 NOTE — PROGRESS NOTE ADULT - PROBLEM SELECTOR PLAN 2
CT incidentally noted 8mm L pelvic renal stone with mild fullness and adjacent periureteral stranding. Patient not endorsing any urinary symptoms currently.   - UA & UCx ordered   - Monitor urine output and SCr  - If poor urine output, can check bladder scan  - Monitor off antibiotics for now  - Chronic issue per pt and family at bedside  - Follows with Dr. Muhammad outpatient (per patient, has had prior discussions --> stenting would be futile and would require surgical intervention but decided to monitor nonop due to risk>benefit) - CT incidentally noted 8mm L pelvic renal stone with mild fullness and adjacent periureteral stranding. Patient not endorsing any urinary symptoms currently.   - Chronic issue per pt and family at bedside and report intermittent hematuria this admission is also a chronic issue as below  - UA & UCx ordered   - Monitor urine output and SCr  - If poor urine output, can check bladder scan  - Monitor off antibiotics for now  - Follows with Dr. Muhammad outpatient (per patient, has had prior discussions --> stenting would be futile and would require surgical intervention but decided to monitor nonop due to risk>benefit)

## 2024-03-25 NOTE — PROGRESS NOTE ADULT - PROBLEM SELECTOR PLAN 5
Hx of chronic afib on Eliquis and atenolol  - Continue home Eliquis 5mg BID  - Continue atenolol 25mg BID; monitor BP and HR  - EKG reviewed showing slow Afib HR 65, qtc 480  - Monitor on telemetry on admission  Records provided through patient portal at [facility] as below   - 2022 echo with LVEF 65-70% and also normal stress test in 2022  - F/u repeat TTE this admission for comparison Hx of chronic afib on Eliquis and atenolol  - Continue home Eliquis 5mg BID  - Continue atenolol 25mg BID; monitor BP and HR  - EKG reviewed showing slow Afib HR 65, qtc 480  - Monitor on telemetry on admission  Records provided through patient portal at [facility] as below   - 2022 echo with LVEF 65-70% and also normal stress test in 2022  - F/u repeat TTE this admission for comparison and r/o cardiogenic syncope

## 2024-03-25 NOTE — PROGRESS NOTE ADULT - SUBJECTIVE AND OBJECTIVE BOX
INTERVAL HPI/OVERNIGHT EVENTS:  Pt seen and examined at bedside.     Allergies/Intolerance: codeine (Nausea)      MEDICATIONS  (STANDING):  apixaban 5 milliGRAM(s) Oral every 12 hours  atenolol  Tablet 25 milliGRAM(s) Oral every 12 hours  lidocaine   4% Patch 1 Patch Transdermal daily  polyethylene glycol 3350 17 Gram(s) Oral two times a day  sertraline 50 milliGRAM(s) Oral daily    MEDICATIONS  (PRN):  acetaminophen     Tablet .. 975 milliGRAM(s) Oral every 6 hours PRN Temp greater or equal to 38C (100.4F), Moderate Pain (4 - 6)  diazepam    Tablet 2.5 milliGRAM(s) Oral daily PRN anxiety        ROS: all systems reviewed and wnl      PHYSICAL EXAMINATION:  Vital Signs Last 24 Hrs  T(C): 36.9 (25 Mar 2024 05:03), Max: 36.9 (25 Mar 2024 05:03)  T(F): 98.4 (25 Mar 2024 05:03), Max: 98.4 (25 Mar 2024 05:03)  HR: 75 (25 Mar 2024 05:03) (70 - 92)  BP: 102/64 (25 Mar 2024 05:03) (92/65 - 124/65)  BP(mean): --  RR: 18 (25 Mar 2024 05:03) (18 - 20)  SpO2: 94% (25 Mar 2024 05:03) (94% - 98%)    Parameters below as of 25 Mar 2024 05:03  Patient On (Oxygen Delivery Method): nasal cannula  O2 Flow (L/min): 2    CAPILLARY BLOOD GLUCOSE          03-24 @ 07:01  -  03-25 @ 07:00  --------------------------------------------------------  IN: 250 mL / OUT: 0 mL / NET: 250 mL        GENERAL: stable, chronic left arm lymphaedema.   NECK: supple, No JVD  CHEST/LUNG: clear to auscultation bilaterally; no rales, rhonchi, or wheezing b/l  HEART: normal S1, S2  ABDOMEN: BS+, soft, ND, NT   EXTREMITIES:  pulses palpable; no clubbing, cyanosis, or edema b/l LEs    LABS:                        9.0    7.75  )-----------( 304      ( 25 Mar 2024 06:21 )             30.2     03-25    142  |  105  |  16  ----------------------------<  131<H>  3.9   |  24  |  0.74    Ca    8.9      25 Mar 2024 06:21  Phos  3.1     03-25  Mg     1.9     03-25    TPro  6.6  /  Alb  3.3  /  TBili  0.6  /  DBili  x   /  AST  17  /  ALT  5<L>  /  AlkPhos  81  03-25    PT/INR - ( 25 Mar 2024 06:21 )   PT: 21.3 sec;   INR: 1.98 ratio         PTT - ( 25 Mar 2024 06:21 )  PTT:33.6 sec  Urinalysis Basic - ( 25 Mar 2024 06:21 )    Color: x / Appearance: x / SG: x / pH: x  Gluc: 131 mg/dL / Ketone: x  / Bili: x / Urobili: x   Blood: x / Protein: x / Nitrite: x   Leuk Esterase: x / RBC: x / WBC x   Sq Epi: x / Non Sq Epi: x / Bacteria: x

## 2024-03-25 NOTE — PHARMACOTHERAPY INTERVENTION NOTE - COMMENTS
Performed medication reconciliation and home medication list updated in prescription writer/ outpatient medication review. Medications verified with patient and pharmacy.     Home medications:  atenolol 25 mg oral tablet: 1 tab(s) orally 2 times a day  Eliquis 5 mg oral tablet: 1 tab(s) orally 2 times a day  hydroCHLOROthiazide 25 mg oral tablet: 1 tab(s) orally once a day  meloxicam 7.5 mg oral tablet: 1 tab(s) orally once a day  metFORMIN 500 mg oral tablet: 1 tab(s) orally once a day  sertraline 50 mg oral tablet: 1 tab(s) orally once a day  Valium 5 mg oral tablet: 0.5 tab(s) orally once a day as needed for  anxiety  vitamin d: 1 tablet daily    Recommendations:    Aye Butler  PharmD Candidate 2024   Performed medication reconciliation and home medication list updated in prescription writer/ outpatient medication review. Medications verified with patient and pharmacy.     Home medications:  atenolol 25 mg oral tablet: 1 tab(s) orally 2 times a day  Eliquis 5 mg oral tablet: 1 tab(s) orally 2 times a day  hydroCHLOROthiazide 25 mg oral tablet: 1 tab(s) orally once a day  meloxicam 7.5 mg oral tablet: 1 tab(s) orally once a day  metFORMIN 500 mg oral tablet: 1 tab(s) orally once a day  sertraline 50 mg oral tablet: 1 tab(s) orally once a day  Valium 5 mg oral tablet: 0.5 tab(s) orally once a day as needed for  anxiety  vitamin d: 1 tablet daily    Aye Butler  PharmD Candidate 2024    Sara Boykin, PharmD, BCPS  Clinical Pharmacy Specialist  Teams (preferred) or [phone]

## 2024-03-25 NOTE — PROGRESS NOTE ADULT - ASSESSMENT
83 year old female with hx of Afib on Eliquis, HTN, GERD, Breast cancer in remission, anxiety, OA, and lymphedema presenting after a fall.       Pt with acute fracture of L1 anterior superior endplate. Pt admitted for further management. Likely needs MRI of LS spine.      Follow up CT of thoraxic ordered by admitting team. L-1 fracture noted on admission CT.     CT head no acute findings, CT neck no fracture.  Ortho following.     Continue home meds: Eliquis 5 mg bid for chronic A.fib and Atenolol 25 mg bid for rate control.     Continue home Zoloft 50 mg/day.     PT eval, pain control.     No active infections.   83 year old female with hx of Afib on Eliquis, HTN, GERD, Breast cancer in remission, anxiety, OA, and lymphedema presenting after a fall.       Pt with acute fracture of L1 anterior superior endplate. Pt admitted for further management. Likely needs MRI of LS spine.      Follow up CT of thoraxic ordered by admitting team. L-1 fracture noted on admission CT.     CT head no acute findings, CT neck no fracture.  Ortho following.     Continue home meds: Eliquis 5 mg bid for chronic A.fib and Atenolol 25 mg bid for rate control.     Continue home Zoloft 50 mg/day.     PT eval, pain control.     No active infections.      IVF for 24 hour, hold Atenolol.     Rehab tomorrow.

## 2024-03-26 DIAGNOSIS — D64.9 ANEMIA, UNSPECIFIED: ICD-10-CM

## 2024-03-26 LAB
ADD ON TEST-SPECIMEN IN LAB: SIGNIFICANT CHANGE UP
ALBUMIN SERPL ELPH-MCNC: 3 G/DL — LOW (ref 3.3–5)
ALP SERPL-CCNC: 83 U/L — SIGNIFICANT CHANGE UP (ref 40–120)
ALT FLD-CCNC: <5 U/L — LOW (ref 10–45)
ANION GAP SERPL CALC-SCNC: 13 MMOL/L — SIGNIFICANT CHANGE UP (ref 5–17)
APPEARANCE UR: ABNORMAL
AST SERPL-CCNC: 13 U/L — SIGNIFICANT CHANGE UP (ref 10–40)
BACTERIA # UR AUTO: ABNORMAL /HPF
BASOPHILS # BLD AUTO: 0.03 K/UL — SIGNIFICANT CHANGE UP (ref 0–0.2)
BASOPHILS # BLD AUTO: 0.03 K/UL — SIGNIFICANT CHANGE UP (ref 0–0.2)
BASOPHILS NFR BLD AUTO: 0.4 % — SIGNIFICANT CHANGE UP (ref 0–2)
BASOPHILS NFR BLD AUTO: 0.4 % — SIGNIFICANT CHANGE UP (ref 0–2)
BILIRUB SERPL-MCNC: 0.6 MG/DL — SIGNIFICANT CHANGE UP (ref 0.2–1.2)
BILIRUB UR-MCNC: ABNORMAL
BUN SERPL-MCNC: 12 MG/DL — SIGNIFICANT CHANGE UP (ref 7–23)
CALCIUM SERPL-MCNC: 8.7 MG/DL — SIGNIFICANT CHANGE UP (ref 8.4–10.5)
CAST: 1 /LPF — SIGNIFICANT CHANGE UP (ref 0–4)
CHLORIDE SERPL-SCNC: 104 MMOL/L — SIGNIFICANT CHANGE UP (ref 96–108)
CK SERPL-CCNC: 140 U/L — SIGNIFICANT CHANGE UP (ref 25–170)
CO2 SERPL-SCNC: 24 MMOL/L — SIGNIFICANT CHANGE UP (ref 22–31)
COLOR SPEC: ABNORMAL
CREAT SERPL-MCNC: 0.71 MG/DL — SIGNIFICANT CHANGE UP (ref 0.5–1.3)
DIFF PNL FLD: ABNORMAL
EGFR: 84 ML/MIN/1.73M2 — SIGNIFICANT CHANGE UP
EOSINOPHIL # BLD AUTO: 0.11 K/UL — SIGNIFICANT CHANGE UP (ref 0–0.5)
EOSINOPHIL # BLD AUTO: 0.22 K/UL — SIGNIFICANT CHANGE UP (ref 0–0.5)
EOSINOPHIL NFR BLD AUTO: 1.4 % — SIGNIFICANT CHANGE UP (ref 0–6)
EOSINOPHIL NFR BLD AUTO: 3.1 % — SIGNIFICANT CHANGE UP (ref 0–6)
GLUCOSE SERPL-MCNC: 112 MG/DL — HIGH (ref 70–99)
GLUCOSE UR QL: NEGATIVE MG/DL — SIGNIFICANT CHANGE UP
HCT VFR BLD CALC: 28.4 % — LOW (ref 34.5–45)
HCT VFR BLD CALC: 32.8 % — LOW (ref 34.5–45)
HGB BLD-MCNC: 8.1 G/DL — LOW (ref 11.5–15.5)
HGB BLD-MCNC: 9.5 G/DL — LOW (ref 11.5–15.5)
IMM GRANULOCYTES NFR BLD AUTO: 0.4 % — SIGNIFICANT CHANGE UP (ref 0–0.9)
IMM GRANULOCYTES NFR BLD AUTO: 0.8 % — SIGNIFICANT CHANGE UP (ref 0–0.9)
IRON SATN MFR SERPL: 25 UG/DL — LOW (ref 30–160)
IRON SATN MFR SERPL: 8 % — LOW (ref 14–50)
KETONES UR-MCNC: NEGATIVE MG/DL — SIGNIFICANT CHANGE UP
LEUKOCYTE ESTERASE UR-ACNC: ABNORMAL
LYMPHOCYTES # BLD AUTO: 0.94 K/UL — LOW (ref 1–3.3)
LYMPHOCYTES # BLD AUTO: 1.53 K/UL — SIGNIFICANT CHANGE UP (ref 1–3.3)
LYMPHOCYTES # BLD AUTO: 11.8 % — LOW (ref 13–44)
LYMPHOCYTES # BLD AUTO: 21.7 % — SIGNIFICANT CHANGE UP (ref 13–44)
MAGNESIUM SERPL-MCNC: 1.8 MG/DL — SIGNIFICANT CHANGE UP (ref 1.6–2.6)
MCHC RBC-ENTMCNC: 22.7 PG — LOW (ref 27–34)
MCHC RBC-ENTMCNC: 22.9 PG — LOW (ref 27–34)
MCHC RBC-ENTMCNC: 28.5 GM/DL — LOW (ref 32–36)
MCHC RBC-ENTMCNC: 29 GM/DL — LOW (ref 32–36)
MCV RBC AUTO: 79 FL — LOW (ref 80–100)
MCV RBC AUTO: 79.6 FL — LOW (ref 80–100)
MONOCYTES # BLD AUTO: 0.6 K/UL — SIGNIFICANT CHANGE UP (ref 0–0.9)
MONOCYTES # BLD AUTO: 0.7 K/UL — SIGNIFICANT CHANGE UP (ref 0–0.9)
MONOCYTES NFR BLD AUTO: 8.5 % — SIGNIFICANT CHANGE UP (ref 2–14)
MONOCYTES NFR BLD AUTO: 8.8 % — SIGNIFICANT CHANGE UP (ref 2–14)
MRSA PCR RESULT.: SIGNIFICANT CHANGE UP
NEUTROPHILS # BLD AUTO: 4.63 K/UL — SIGNIFICANT CHANGE UP (ref 1.8–7.4)
NEUTROPHILS # BLD AUTO: 6.15 K/UL — SIGNIFICANT CHANGE UP (ref 1.8–7.4)
NEUTROPHILS NFR BLD AUTO: 65.9 % — SIGNIFICANT CHANGE UP (ref 43–77)
NEUTROPHILS NFR BLD AUTO: 76.8 % — SIGNIFICANT CHANGE UP (ref 43–77)
NITRITE UR-MCNC: POSITIVE
NRBC # BLD: 0 /100 WBCS — SIGNIFICANT CHANGE UP (ref 0–0)
NRBC # BLD: 0 /100 WBCS — SIGNIFICANT CHANGE UP (ref 0–0)
PH UR: 5.5 — SIGNIFICANT CHANGE UP (ref 5–8)
PHOSPHATE SERPL-MCNC: 2.7 MG/DL — SIGNIFICANT CHANGE UP (ref 2.5–4.5)
PLATELET # BLD AUTO: 277 K/UL — SIGNIFICANT CHANGE UP (ref 150–400)
PLATELET # BLD AUTO: 280 K/UL — SIGNIFICANT CHANGE UP (ref 150–400)
POTASSIUM SERPL-MCNC: 3.5 MMOL/L — SIGNIFICANT CHANGE UP (ref 3.5–5.3)
POTASSIUM SERPL-SCNC: 3.5 MMOL/L — SIGNIFICANT CHANGE UP (ref 3.5–5.3)
PROT SERPL-MCNC: 6.2 G/DL — SIGNIFICANT CHANGE UP (ref 6–8.3)
PROT UR-MCNC: 300 MG/DL
RBC # BLD: 3.57 M/UL — LOW (ref 3.8–5.2)
RBC # BLD: 4.15 M/UL — SIGNIFICANT CHANGE UP (ref 3.8–5.2)
RBC # FLD: 16.8 % — HIGH (ref 10.3–14.5)
RBC # FLD: 17 % — HIGH (ref 10.3–14.5)
RBC CASTS # UR COMP ASSIST: >1900 /HPF — HIGH (ref 0–4)
REVIEW: SIGNIFICANT CHANGE UP
S AUREUS DNA NOSE QL NAA+PROBE: DETECTED
SODIUM SERPL-SCNC: 141 MMOL/L — SIGNIFICANT CHANGE UP (ref 135–145)
SP GR SPEC: 1.02 — SIGNIFICANT CHANGE UP (ref 1–1.03)
SQUAMOUS # UR AUTO: 4 /HPF — SIGNIFICANT CHANGE UP (ref 0–5)
TIBC SERPL-MCNC: 292 UG/DL — SIGNIFICANT CHANGE UP (ref 220–430)
UIBC SERPL-MCNC: 267 UG/DL — SIGNIFICANT CHANGE UP (ref 110–370)
UROBILINOGEN FLD QL: 1 MG/DL — SIGNIFICANT CHANGE UP (ref 0.2–1)
WBC # BLD: 7.04 K/UL — SIGNIFICANT CHANGE UP (ref 3.8–10.5)
WBC # BLD: 7.99 K/UL — SIGNIFICANT CHANGE UP (ref 3.8–10.5)
WBC # FLD AUTO: 7.04 K/UL — SIGNIFICANT CHANGE UP (ref 3.8–10.5)
WBC # FLD AUTO: 7.99 K/UL — SIGNIFICANT CHANGE UP (ref 3.8–10.5)
WBC UR QL: 128 /HPF — HIGH (ref 0–5)

## 2024-03-26 PROCEDURE — 74177 CT ABD & PELVIS W/CONTRAST: CPT | Mod: 26

## 2024-03-26 RX ORDER — PIPERACILLIN AND TAZOBACTAM 4; .5 G/20ML; G/20ML
3.38 INJECTION, POWDER, LYOPHILIZED, FOR SOLUTION INTRAVENOUS ONCE
Refills: 0 | Status: COMPLETED | OUTPATIENT
Start: 2024-03-26 | End: 2024-03-26

## 2024-03-26 RX ORDER — PIPERACILLIN AND TAZOBACTAM 4; .5 G/20ML; G/20ML
3.38 INJECTION, POWDER, LYOPHILIZED, FOR SOLUTION INTRAVENOUS EVERY 8 HOURS
Refills: 0 | Status: DISCONTINUED | OUTPATIENT
Start: 2024-03-26 | End: 2024-03-28

## 2024-03-26 RX ORDER — PANTOPRAZOLE SODIUM 20 MG/1
40 TABLET, DELAYED RELEASE ORAL
Refills: 0 | Status: DISCONTINUED | OUTPATIENT
Start: 2024-03-26 | End: 2024-03-30

## 2024-03-26 RX ORDER — POTASSIUM CHLORIDE 20 MEQ
40 PACKET (EA) ORAL ONCE
Refills: 0 | Status: COMPLETED | OUTPATIENT
Start: 2024-03-26 | End: 2024-03-26

## 2024-03-26 RX ADMIN — LIDOCAINE 1 PATCH: 4 CREAM TOPICAL at 11:49

## 2024-03-26 RX ADMIN — PIPERACILLIN AND TAZOBACTAM 25 GRAM(S): 4; .5 INJECTION, POWDER, LYOPHILIZED, FOR SOLUTION INTRAVENOUS at 22:14

## 2024-03-26 RX ADMIN — PIPERACILLIN AND TAZOBACTAM 25 GRAM(S): 4; .5 INJECTION, POWDER, LYOPHILIZED, FOR SOLUTION INTRAVENOUS at 17:05

## 2024-03-26 RX ADMIN — PANTOPRAZOLE SODIUM 40 MILLIGRAM(S): 20 TABLET, DELAYED RELEASE ORAL at 09:06

## 2024-03-26 RX ADMIN — Medication 40 MILLIEQUIVALENT(S): at 09:06

## 2024-03-26 RX ADMIN — LIDOCAINE 1 PATCH: 4 CREAM TOPICAL at 23:52

## 2024-03-26 RX ADMIN — POLYETHYLENE GLYCOL 3350 17 GRAM(S): 17 POWDER, FOR SOLUTION ORAL at 05:57

## 2024-03-26 RX ADMIN — CHLORHEXIDINE GLUCONATE 1 APPLICATION(S): 213 SOLUTION TOPICAL at 11:51

## 2024-03-26 RX ADMIN — PIPERACILLIN AND TAZOBACTAM 200 GRAM(S): 4; .5 INJECTION, POWDER, LYOPHILIZED, FOR SOLUTION INTRAVENOUS at 13:41

## 2024-03-26 RX ADMIN — SERTRALINE 50 MILLIGRAM(S): 25 TABLET, FILM COATED ORAL at 11:48

## 2024-03-26 RX ADMIN — APIXABAN 5 MILLIGRAM(S): 2.5 TABLET, FILM COATED ORAL at 05:58

## 2024-03-26 NOTE — PROGRESS NOTE ADULT - PROBLEM SELECTOR PLAN 7
DVT ppx: on Eliquis 5 BID   Diet: DASH  Dispo: pending, PT eval repeat after obtaining TSLO brace     Per son, multiple falls at home. Pt used to be independent and was driving up until a few months ago. However son feels patient may now need more assistance and at home and would like to consider options. Would like to speak with case management.     Patient's son Keshav Raymundo updated 3/24/24 early AM and 3/25 PM as well

## 2024-03-26 NOTE — PROGRESS NOTE ADULT - PROBLEM SELECTOR PLAN 4
Mildly elevated CK likely due to prolonged time on floor following fall the day prior  - Will trend CK for now downtrending  - Gentle IVF to prevent DIONISIO, will CTM Cr  - F/u UA as above Slightly low K 3.4 likely from medication HCTZ.  - C/w K+ repletion as needed   - Hold HCTZ for now

## 2024-03-26 NOTE — PROGRESS NOTE ADULT - SUBJECTIVE AND OBJECTIVE BOX
INTERVAL HPI/OVERNIGHT EVENTS:  Pt seen and examined at bedside.     Allergies/Intolerance: codeine (Nausea)      MEDICATIONS  (STANDING):  chlorhexidine 2% Cloths 1 Application(s) Topical daily  lidocaine   4% Patch 1 Patch Transdermal daily  pantoprazole    Tablet 40 milliGRAM(s) Oral before breakfast  polyethylene glycol 3350 17 Gram(s) Oral two times a day  sertraline 50 milliGRAM(s) Oral daily    MEDICATIONS  (PRN):  acetaminophen     Tablet .. 975 milliGRAM(s) Oral every 6 hours PRN Temp greater or equal to 38C (100.4F), Moderate Pain (4 - 6)  diazepam    Tablet 2.5 milliGRAM(s) Oral daily PRN anxiety        ROS: all systems reviewed and wnl      PHYSICAL EXAMINATION:  Vital Signs Last 24 Hrs  T(C): 37.2 (26 Mar 2024 04:21), Max: 37.2 (25 Mar 2024 12:07)  T(F): 99 (26 Mar 2024 04:21), Max: 99 (25 Mar 2024 12:07)  HR: 95 (26 Mar 2024 04:21) (71 - 98)  BP: 109/66 (26 Mar 2024 04:21) (94/64 - 121/76)  BP(mean): --  RR: 18 (26 Mar 2024 04:21) (18 - 18)  SpO2: 95% (26 Mar 2024 04:21) (94% - 99%)    Parameters below as of 26 Mar 2024 04:21  Patient On (Oxygen Delivery Method): nasal cannula  O2 Flow (L/min): 2    CAPILLARY BLOOD GLUCOSE          03-25 @ 07:01  -  03-26 @ 07:00  --------------------------------------------------------  IN: 1080 mL / OUT: 350 mL / NET: 730 mL        GENERAL: in bed, stable, LBP controlled, full MP both LE 5/5 MP  NECK: supple, No JVD  CHEST/LUNG: clear to auscultation bilaterally; no rales, rhonchi, or wheezing b/l  HEART: normal S1, S2  ABDOMEN: BS+, soft, ND, NT   EXTREMITIES:  pulses palpable; no clubbing, cyanosis, or edema b/l LEs    LABS:                        8.1    7.04  )-----------( 280      ( 26 Mar 2024 06:38 )             28.4     03-26    141  |  104  |  12  ----------------------------<  112<H>  3.5   |  24  |  0.71    Ca    8.7      26 Mar 2024 06:40  Phos  2.7     03-26  Mg     1.8     03-26    TPro  6.2  /  Alb  3.0<L>  /  TBili  0.6  /  DBili  x   /  AST  13  /  ALT  <5<L>  /  AlkPhos  83  03-26    PT/INR - ( 25 Mar 2024 06:21 )   PT: 21.3 sec;   INR: 1.98 ratio         PTT - ( 25 Mar 2024 06:21 )  PTT:33.6 sec  Urinalysis Basic - ( 26 Mar 2024 06:40 )    Color: x / Appearance: x / SG: x / pH: x  Gluc: 112 mg/dL / Ketone: x  / Bili: x / Urobili: x   Blood: x / Protein: x / Nitrite: x   Leuk Esterase: x / RBC: x / WBC x   Sq Epi: x / Non Sq Epi: x / Bacteria: x

## 2024-03-26 NOTE — PROGRESS NOTE ADULT - PROBLEM SELECTOR PLAN 2
- CT incidentally noted 8mm L pelvic renal stone with mild fullness and adjacent periureteral stranding. Patient not endorsing any urinary symptoms currently.   - Chronic issue per pt and family at bedside and report intermittent hematuria this admission is also a chronic issue as below  - UA & UCx ordered   - Monitor urine output and SCr  - If poor urine output, can check bladder scan  - Monitor off antibiotics for now  - Follows with Dr. Muhammad outpatient (per patient, has had prior discussions --> stenting would be futile and would require surgical intervention but decided to monitor nonop due to risk>benefit) - CT incidentally noted 8mm L pelvic renal stone with mild fullness and adjacent periureteral stranding. Patient not endorsing any urinary symptoms currently.   - Chronic issue per pt and family at bedside and report intermittent hematuria this admission is also a chronic issue as below  - UA & UCx ordered   - Monitor urine output and SCr  - If poor urine output, can check bladder scan  - Monitor off antibiotics for now  - Follows with Dr. Muhammad outpatient (per patient, has had prior discussions --> stenting would be futile and would require surgical intervention but decided to monitor nonop due to risk>benefit)  - F/u CT w/wo to further evaluate stone burden and RP bleed

## 2024-03-26 NOTE — PROGRESS NOTE ADULT - PROBLEM SELECTOR PLAN 3
Slightly low K 3.4 likely from medication HCTZ.  - C/w K+ repletion as needed   - Hold HCTZ for now - Pt with gradually decreasing Hgb, now 8.1 on admission  - Most likely due to chronic hematuria 2/2 renal stones as above +/- dilutional iso IVF overnight, but f/u CT AP w/wo to r/o RP bleed  - F/u iron studies in AM  - F/u CBC in AM  - Mainatin active type and screen (ordered for 3/27)

## 2024-03-26 NOTE — PROGRESS NOTE ADULT - PROBLEM SELECTOR PLAN 5
Hx of chronic afib on Eliquis and atenolol  - Continue home Eliquis 5mg BID  - Continue atenolol 25mg BID; monitor BP and HR  - EKG reviewed showing slow Afib HR 65, qtc 480  - Monitor on telemetry on admission  Records provided through patient portal at [facility] as below   - 2022 echo with LVEF 65-70% and also normal stress test in 2022  - F/u repeat TTE this admission for comparison and r/o cardiogenic syncope Mildly elevated CK likely due to prolonged time on floor following fall the day prior  - Downtrending and now WNL  - S/p IVF to prevent DIONISIO, will CTM Cr  - F/u UA as above

## 2024-03-26 NOTE — PROGRESS NOTE ADULT - ASSESSMENT
83 year old female with hx of Afib on Eliquis, HTN, GERD, Breast cancer in remission, anxiety, OA, and lymphedema presenting after a fall.       Pt with acute fracture of L1 anterior superior endplate. Pt admitted for further management. Likely needs MRI of LS spine.      Follow up CT of thoraxic ordered by admitting team. L-1 fracture noted on admission CT.     CT head no acute findings, CT neck no fracture.  Ortho following.     Continue home meds: Eliquis 5 mg bid for chronic A.fib and Atenolol 25 mg bid for rate control.     Continue home Zoloft 50 mg/day.     PT eval, pain control.     No active infections.      IVF for 24 hour, hold Atenolol.      83 year old female with hx of Afib on Eliquis, HTN, GERD, Breast cancer in remission, anxiety, OA, and lymphedema presenting after a fall.         Pt with acute fracture of L1 anterior superior endplate. Pt admitted for further management. .      Follow up CT of thoraxic ordered by admitting team. L-1 fracture noted on admission CT.     CT head no acute findings, CT neck no fracture.  Ortho following.     Continue home meds: Eliquis on hold, A.fib stable, Atenolol on hold.       Continue home Zoloft 50 mg/day.     PT eval, pain control.     No active infections.      Await TLSO brace, repeat CBC in AM, incentive spirometry.

## 2024-03-26 NOTE — PROGRESS NOTE ADULT - PROBLEM SELECTOR PLAN 6
DVT ppx: on Eliquis 5 BID   Diet: DASH  Dispo: pending, PT eval repeat after obtaining TSLO brace     Per son, multiple falls at home. Pt used to be independent and was driving up until a few months ago. However son feels patient may now need more assistance and at home and would like to consider options. Would like to speak with case management.     Patient's son Keshav Raymundo updated 3/24/24 early AM and 3/25 PM as well Hx of chronic afib on Eliquis and atenolol  - Continue home Eliquis 5mg BID  - Holding atenolol 25mg BID for now as sBP low in 100s, resume as outpatient  - CTM monitor BP and HR  - EKG reviewed showing slow Afib HR 65, qtc 480  - Monitor on telemetry on admission  - Cards Records provided through patient portal at [facility] as below:  - 2022 echo with LVEF 65-70% and also normal stress test in 2022  - F/u repeat TTE this admission for comparison and r/o cardiogenic syncope  - F/u ECG (ordered 3/27)

## 2024-03-26 NOTE — PROGRESS NOTE ADULT - SUBJECTIVE AND OBJECTIVE BOX
PROGRESS NOTE:   Authored by Dr. Gatito Vargas MD (PGY-1)    Patient is a 83y old  Female who presents with a chief complaint of Fall (25 Mar 2024 08:48)      SUBJECTIVE / OVERNIGHT EVENTS:  No acute events overnight.       MEDICATIONS  (STANDING):  apixaban 5 milliGRAM(s) Oral every 12 hours  chlorhexidine 2% Cloths 1 Application(s) Topical daily  lidocaine   4% Patch 1 Patch Transdermal daily  polyethylene glycol 3350 17 Gram(s) Oral two times a day  sertraline 50 milliGRAM(s) Oral daily  sodium chloride 0.45%. 1000 milliLiter(s) (100 mL/Hr) IV Continuous <Continuous>    MEDICATIONS  (PRN):  acetaminophen     Tablet .. 975 milliGRAM(s) Oral every 6 hours PRN Temp greater or equal to 38C (100.4F), Moderate Pain (4 - 6)  diazepam    Tablet 2.5 milliGRAM(s) Oral daily PRN anxiety      CAPILLARY BLOOD GLUCOSE        I&O's Summary    25 Mar 2024 07:01  -  26 Mar 2024 07:00  --------------------------------------------------------  IN: 1080 mL / OUT: 350 mL / NET: 730 mL        PHYSICAL EXAM:  Vital Signs Last 24 Hrs  T(C): 37.2 (26 Mar 2024 04:21), Max: 37.2 (25 Mar 2024 12:07)  T(F): 99 (26 Mar 2024 04:21), Max: 99 (25 Mar 2024 12:07)  HR: 95 (26 Mar 2024 04:21) (71 - 98)  BP: 109/66 (26 Mar 2024 04:21) (94/64 - 121/76)  BP(mean): --  RR: 18 (26 Mar 2024 04:21) (18 - 18)  SpO2: 95% (26 Mar 2024 04:21) (94% - 99%)    Parameters below as of 26 Mar 2024 04:21  Patient On (Oxygen Delivery Method): nasal cannula  O2 Flow (L/min): 2      CONSTITUTIONAL: Well-groomed, in no apparent distress  EYES: No conjunctival or scleral injection, EOM grossly intact and symmetric  ENMT: MMM  RESPIRATORY: Breathing comfortably; lungs CTA without wheeze/rhonchi/rales  CARDIOVASCULAR: +S1S2, RRR, no M/G/R; 1+ lower extremity edema, equal  GASTROINTESTINAL: No palpable masses or tenderness, +BS throughout, no rebound/guarding; no hepatosplenomegaly; no hernia palpated  MUSCULOSKELETAL: No digital clubbing or cyanosis; no cervical tenderness; low back tenderness on palpation; no malalignment of extremities  SKIN: small laceration in R lateral forehead with hemostasis, no active bleeding. unilateral UE chronic lymphedema, unchanged   NEUROLOGIC: CN grossly intact; sensation intact in LEs b/l to light touch; normal strength and tone in UE/LE bl  PSYCHIATRIC: A+O x 3; mood and affect appropriate; appropriate insight and judgment    LABS:                        8.1    7.04  )-----------( 280      ( 26 Mar 2024 06:38 )             28.4     03-26    141  |  104  |  12  ----------------------------<  112<H>  3.5   |  24  |  0.71    Ca    8.7      26 Mar 2024 06:40  Phos  2.7     03-26  Mg     1.8     03-26    TPro  6.2  /  Alb  3.0<L>  /  TBili  0.6  /  DBili  x   /  AST  13  /  ALT  <5<L>  /  AlkPhos  83  03-26    PT/INR - ( 25 Mar 2024 06:21 )   PT: 21.3 sec;   INR: 1.98 ratio         PTT - ( 25 Mar 2024 06:21 )  PTT:33.6 sec  CARDIAC MARKERS ( 26 Mar 2024 06:40 )  x     / x     / 140 U/L / x     / x          Urinalysis Basic - ( 26 Mar 2024 06:40 )    Color: x / Appearance: x / SG: x / pH: x  Gluc: 112 mg/dL / Ketone: x  / Bili: x / Urobili: x   Blood: x / Protein: x / Nitrite: x   Leuk Esterase: x / RBC: x / WBC x   Sq Epi: x / Non Sq Epi: x / Bacteria: x          Tele Reviewed:    RADIOLOGY & ADDITIONAL TESTS:  Results Reviewed:   Imaging Personally Reviewed:  Electrocardiogram Personally Reviewed:     PROGRESS NOTE:   Authored by Dr. Gatito Vargas MD (PGY-1)    Patient is a 83y old  Female who presents with a chief complaint of Fall (25 Mar 2024 08:48)      SUBJECTIVE / OVERNIGHT EVENTS:  No acute events overnight. Denies any flank, back pain above baseline since initial fracture. Denies CP/SOB/Palpitations. Denies other questions or concerns at this time.       MEDICATIONS  (STANDING):  apixaban 5 milliGRAM(s) Oral every 12 hours  chlorhexidine 2% Cloths 1 Application(s) Topical daily  lidocaine   4% Patch 1 Patch Transdermal daily  polyethylene glycol 3350 17 Gram(s) Oral two times a day  sertraline 50 milliGRAM(s) Oral daily  sodium chloride 0.45%. 1000 milliLiter(s) (100 mL/Hr) IV Continuous <Continuous>    MEDICATIONS  (PRN):  acetaminophen     Tablet .. 975 milliGRAM(s) Oral every 6 hours PRN Temp greater or equal to 38C (100.4F), Moderate Pain (4 - 6)  diazepam    Tablet 2.5 milliGRAM(s) Oral daily PRN anxiety      CAPILLARY BLOOD GLUCOSE        I&O's Summary    25 Mar 2024 07:01  -  26 Mar 2024 07:00  --------------------------------------------------------  IN: 1080 mL / OUT: 350 mL / NET: 730 mL        PHYSICAL EXAM:  Vital Signs Last 24 Hrs  T(C): 37.2 (26 Mar 2024 04:21), Max: 37.2 (25 Mar 2024 12:07)  T(F): 99 (26 Mar 2024 04:21), Max: 99 (25 Mar 2024 12:07)  HR: 95 (26 Mar 2024 04:21) (71 - 98)  BP: 109/66 (26 Mar 2024 04:21) (94/64 - 121/76)  BP(mean): --  RR: 18 (26 Mar 2024 04:21) (18 - 18)  SpO2: 95% (26 Mar 2024 04:21) (94% - 99%)    Parameters below as of 26 Mar 2024 04:21  Patient On (Oxygen Delivery Method): nasal cannula  O2 Flow (L/min): 2      CONSTITUTIONAL: Well-groomed, in no apparent distress  EYES: No conjunctival or scleral injection, EOM grossly intact   ENMT: MMM  RESPIRATORY: Breathing comfortably; lungs CTA without wheeze/rhonchi/rales appreciated bl  CARDIOVASCULAR: +S1S2, RRR, no M/G/R; 1+ lower extremity edema, equal  GASTROINTESTINAL: No palpable masses or tenderness, +BS throughout, no rebound/guarding; no hepatosplenomegaly; no hernia palpated  : No flank ttp. Blood tinged urine in collection container. No lucina hematuria   MUSCULOSKELETAL: No digital clubbing or cyanosis; no cervical tenderness; low back tenderness on palpation; no malalignment of extremities  SKIN: small laceration in R lateral forehead with hemostasis, no active bleeding. LUE chronic lymphedema, unchanged from prior exams  NEUROLOGIC: CN grossly intact; sensation intact in LEs b/l to light touch; normal strength and tone in UE/LE bl  PSYCHIATRIC: A+O x 3; mood and affect appropriate; appropriate insight and judgment    LABS:                        8.1    7.04  )-----------( 280      ( 26 Mar 2024 06:38 )             28.4     03-26    141  |  104  |  12  ----------------------------<  112<H>  3.5   |  24  |  0.71    Ca    8.7      26 Mar 2024 06:40  Phos  2.7     03-26  Mg     1.8     03-26    TPro  6.2  /  Alb  3.0<L>  /  TBili  0.6  /  DBili  x   /  AST  13  /  ALT  <5<L>  /  AlkPhos  83  03-26    PT/INR - ( 25 Mar 2024 06:21 )   PT: 21.3 sec;   INR: 1.98 ratio         PTT - ( 25 Mar 2024 06:21 )  PTT:33.6 sec  CARDIAC MARKERS ( 26 Mar 2024 06:40 )  x     / x     / 140 U/L / x     / x          Urinalysis Basic - ( 26 Mar 2024 06:40 )    Color: x / Appearance: x / SG: x / pH: x  Gluc: 112 mg/dL / Ketone: x  / Bili: x / Urobili: x   Blood: x / Protein: x / Nitrite: x   Leuk Esterase: x / RBC: x / WBC x   Sq Epi: x / Non Sq Epi: x / Bacteria: x          Tele Reviewed:    RADIOLOGY & ADDITIONAL TESTS:  Results Reviewed:   Imaging Personally Reviewed:  Electrocardiogram Personally Reviewed:

## 2024-03-27 DIAGNOSIS — N39.0 URINARY TRACT INFECTION, SITE NOT SPECIFIED: ICD-10-CM

## 2024-03-27 LAB
ALBUMIN SERPL ELPH-MCNC: 3 G/DL — LOW (ref 3.3–5)
ALP SERPL-CCNC: 101 U/L — SIGNIFICANT CHANGE UP (ref 40–120)
ALT FLD-CCNC: 6 U/L — LOW (ref 10–45)
ANION GAP SERPL CALC-SCNC: 11 MMOL/L — SIGNIFICANT CHANGE UP (ref 5–17)
AST SERPL-CCNC: 11 U/L — SIGNIFICANT CHANGE UP (ref 10–40)
BASOPHILS # BLD AUTO: 0.03 K/UL — SIGNIFICANT CHANGE UP (ref 0–0.2)
BASOPHILS NFR BLD AUTO: 0.4 % — SIGNIFICANT CHANGE UP (ref 0–2)
BILIRUB SERPL-MCNC: 0.6 MG/DL — SIGNIFICANT CHANGE UP (ref 0.2–1.2)
BLD GP AB SCN SERPL QL: NEGATIVE — SIGNIFICANT CHANGE UP
BUN SERPL-MCNC: 10 MG/DL — SIGNIFICANT CHANGE UP (ref 7–23)
CALCIUM SERPL-MCNC: 8.5 MG/DL — SIGNIFICANT CHANGE UP (ref 8.4–10.5)
CHLORIDE SERPL-SCNC: 104 MMOL/L — SIGNIFICANT CHANGE UP (ref 96–108)
CO2 SERPL-SCNC: 26 MMOL/L — SIGNIFICANT CHANGE UP (ref 22–31)
CREAT SERPL-MCNC: 0.71 MG/DL — SIGNIFICANT CHANGE UP (ref 0.5–1.3)
EGFR: 84 ML/MIN/1.73M2 — SIGNIFICANT CHANGE UP
EOSINOPHIL # BLD AUTO: 0.15 K/UL — SIGNIFICANT CHANGE UP (ref 0–0.5)
EOSINOPHIL NFR BLD AUTO: 2.1 % — SIGNIFICANT CHANGE UP (ref 0–6)
FERRITIN SERPL-MCNC: 35 NG/ML — SIGNIFICANT CHANGE UP (ref 13–330)
GLUCOSE SERPL-MCNC: 148 MG/DL — HIGH (ref 70–99)
HCT VFR BLD CALC: 28.4 % — LOW (ref 34.5–45)
HGB BLD-MCNC: 8.3 G/DL — LOW (ref 11.5–15.5)
IMM GRANULOCYTES NFR BLD AUTO: 1.4 % — HIGH (ref 0–0.9)
IRON SATN MFR SERPL: 19 UG/DL — LOW (ref 30–160)
IRON SATN MFR SERPL: 6 % — LOW (ref 14–50)
LYMPHOCYTES # BLD AUTO: 0.94 K/UL — LOW (ref 1–3.3)
LYMPHOCYTES # BLD AUTO: 13.1 % — SIGNIFICANT CHANGE UP (ref 13–44)
MAGNESIUM SERPL-MCNC: 2 MG/DL — SIGNIFICANT CHANGE UP (ref 1.6–2.6)
MCHC RBC-ENTMCNC: 23 PG — LOW (ref 27–34)
MCHC RBC-ENTMCNC: 29.2 GM/DL — LOW (ref 32–36)
MCV RBC AUTO: 78.7 FL — LOW (ref 80–100)
MONOCYTES # BLD AUTO: 0.72 K/UL — SIGNIFICANT CHANGE UP (ref 0–0.9)
MONOCYTES NFR BLD AUTO: 10 % — SIGNIFICANT CHANGE UP (ref 2–14)
NEUTROPHILS # BLD AUTO: 5.25 K/UL — SIGNIFICANT CHANGE UP (ref 1.8–7.4)
NEUTROPHILS NFR BLD AUTO: 73 % — SIGNIFICANT CHANGE UP (ref 43–77)
NRBC # BLD: 0 /100 WBCS — SIGNIFICANT CHANGE UP (ref 0–0)
PHOSPHATE SERPL-MCNC: 2.8 MG/DL — SIGNIFICANT CHANGE UP (ref 2.5–4.5)
PLATELET # BLD AUTO: 280 K/UL — SIGNIFICANT CHANGE UP (ref 150–400)
POTASSIUM SERPL-MCNC: 3.9 MMOL/L — SIGNIFICANT CHANGE UP (ref 3.5–5.3)
POTASSIUM SERPL-SCNC: 3.9 MMOL/L — SIGNIFICANT CHANGE UP (ref 3.5–5.3)
PROT SERPL-MCNC: 6.1 G/DL — SIGNIFICANT CHANGE UP (ref 6–8.3)
RBC # BLD: 3.61 M/UL — LOW (ref 3.8–5.2)
RBC # BLD: 3.61 M/UL — LOW (ref 3.8–5.2)
RBC # FLD: 16.8 % — HIGH (ref 10.3–14.5)
RETICS #: 66.4 K/UL — SIGNIFICANT CHANGE UP (ref 25–125)
RETICS/RBC NFR: 1.8 % — SIGNIFICANT CHANGE UP (ref 0.5–2.5)
RH IG SCN BLD-IMP: POSITIVE — SIGNIFICANT CHANGE UP
SODIUM SERPL-SCNC: 141 MMOL/L — SIGNIFICANT CHANGE UP (ref 135–145)
TIBC SERPL-MCNC: 309 UG/DL — SIGNIFICANT CHANGE UP (ref 220–430)
UIBC SERPL-MCNC: 290 UG/DL — SIGNIFICANT CHANGE UP (ref 110–370)
WBC # BLD: 7.19 K/UL — SIGNIFICANT CHANGE UP (ref 3.8–10.5)
WBC # FLD AUTO: 7.19 K/UL — SIGNIFICANT CHANGE UP (ref 3.8–10.5)

## 2024-03-27 PROCEDURE — 71045 X-RAY EXAM CHEST 1 VIEW: CPT | Mod: 26

## 2024-03-27 PROCEDURE — 93010 ELECTROCARDIOGRAM REPORT: CPT

## 2024-03-27 RX ORDER — APIXABAN 2.5 MG/1
5 TABLET, FILM COATED ORAL
Refills: 0 | Status: DISCONTINUED | OUTPATIENT
Start: 2024-03-27 | End: 2024-03-27

## 2024-03-27 RX ORDER — APIXABAN 2.5 MG/1
5 TABLET, FILM COATED ORAL
Refills: 0 | Status: DISCONTINUED | OUTPATIENT
Start: 2024-03-27 | End: 2024-03-30

## 2024-03-27 RX ORDER — FUROSEMIDE 40 MG
20 TABLET ORAL DAILY
Refills: 0 | Status: DISCONTINUED | OUTPATIENT
Start: 2024-03-27 | End: 2024-03-30

## 2024-03-27 RX ORDER — ATENOLOL 25 MG/1
25 TABLET ORAL EVERY 12 HOURS
Refills: 0 | Status: DISCONTINUED | OUTPATIENT
Start: 2024-03-27 | End: 2024-03-30

## 2024-03-27 RX ORDER — FERROUS SULFATE 325(65) MG
325 TABLET ORAL DAILY
Refills: 0 | Status: DISCONTINUED | OUTPATIENT
Start: 2024-03-27 | End: 2024-03-30

## 2024-03-27 RX ADMIN — Medication 975 MILLIGRAM(S): at 12:47

## 2024-03-27 RX ADMIN — Medication 325 MILLIGRAM(S): at 14:29

## 2024-03-27 RX ADMIN — APIXABAN 5 MILLIGRAM(S): 2.5 TABLET, FILM COATED ORAL at 17:09

## 2024-03-27 RX ADMIN — CHLORHEXIDINE GLUCONATE 1 APPLICATION(S): 213 SOLUTION TOPICAL at 11:51

## 2024-03-27 RX ADMIN — Medication 975 MILLIGRAM(S): at 11:57

## 2024-03-27 RX ADMIN — PIPERACILLIN AND TAZOBACTAM 25 GRAM(S): 4; .5 INJECTION, POWDER, LYOPHILIZED, FOR SOLUTION INTRAVENOUS at 21:25

## 2024-03-27 RX ADMIN — PIPERACILLIN AND TAZOBACTAM 25 GRAM(S): 4; .5 INJECTION, POWDER, LYOPHILIZED, FOR SOLUTION INTRAVENOUS at 14:32

## 2024-03-27 RX ADMIN — Medication 20 MILLIGRAM(S): at 14:29

## 2024-03-27 RX ADMIN — LIDOCAINE 1 PATCH: 4 CREAM TOPICAL at 03:14

## 2024-03-27 RX ADMIN — PIPERACILLIN AND TAZOBACTAM 25 GRAM(S): 4; .5 INJECTION, POWDER, LYOPHILIZED, FOR SOLUTION INTRAVENOUS at 06:28

## 2024-03-27 RX ADMIN — SERTRALINE 50 MILLIGRAM(S): 25 TABLET, FILM COATED ORAL at 11:56

## 2024-03-27 RX ADMIN — PANTOPRAZOLE SODIUM 40 MILLIGRAM(S): 20 TABLET, DELAYED RELEASE ORAL at 06:31

## 2024-03-27 NOTE — PROGRESS NOTE ADULT - SUBJECTIVE AND OBJECTIVE BOX
INTERVAL HPI/OVERNIGHT EVENTS:  Pt seen and examined at bedside.     Allergies/Intolerance: codeine (Nausea)      MEDICATIONS  (STANDING):  chlorhexidine 2% Cloths 1 Application(s) Topical daily  lidocaine   4% Patch 1 Patch Transdermal daily  pantoprazole    Tablet 40 milliGRAM(s) Oral before breakfast  piperacillin/tazobactam IVPB.. 3.375 Gram(s) IV Intermittent every 8 hours  polyethylene glycol 3350 17 Gram(s) Oral two times a day  sertraline 50 milliGRAM(s) Oral daily    MEDICATIONS  (PRN):  acetaminophen     Tablet .. 975 milliGRAM(s) Oral every 6 hours PRN Temp greater or equal to 38C (100.4F), Moderate Pain (4 - 6)  diazepam    Tablet 2.5 milliGRAM(s) Oral daily PRN anxiety        ROS: all systems reviewed and wnl      PHYSICAL EXAMINATION:  Vital Signs Last 24 Hrs  T(C): 36.8 (27 Mar 2024 05:15), Max: 37.5 (26 Mar 2024 21:16)  T(F): 98.3 (27 Mar 2024 05:15), Max: 99.5 (26 Mar 2024 21:16)  HR: 111 (27 Mar 2024 05:15) (95 - 111)  BP: 114/72 (27 Mar 2024 05:15) (113/69 - 114/72)  BP(mean): --  RR: 18 (27 Mar 2024 05:15) (18 - 18)  SpO2: 96% (27 Mar 2024 05:15) (96% - 96%)    Parameters below as of 27 Mar 2024 05:15  Patient On (Oxygen Delivery Method): nasal cannula  O2 Flow (L/min): 2    CAPILLARY BLOOD GLUCOSE          03-26 @ 07:01  -  03-27 @ 07:00  --------------------------------------------------------  IN: 240 mL / OUT: 1050 mL / NET: -810 mL        GENERAL: stable, LE intact, full MP both LE, alert, answers all questions  NECK: supple, No JVD  CHEST/LUNG: clear to auscultation bilaterally; no rales, rhonchi, or wheezing b/l  HEART: normal S1, S2  ABDOMEN: BS+, soft, ND, NT   EXTREMITIES:  pulses palpable; no clubbing, cyanosis, or edema b/l LEs    LABS:                        8.3    7.19  )-----------( 280      ( 27 Mar 2024 05:23 )             28.4     03-27    141  |  104  |  10  ----------------------------<  148<H>  3.9   |  26  |  0.71    Ca    8.5      27 Mar 2024 05:23  Phos  2.8     03-27  Mg     2.0     03-27    TPro  6.1  /  Alb  3.0<L>  /  TBili  0.6  /  DBili  x   /  AST  11  /  ALT  6<L>  /  AlkPhos  101  03-27      Urinalysis Basic - ( 27 Mar 2024 05:23 )    Color: x / Appearance: x / SG: x / pH: x  Gluc: 148 mg/dL / Ketone: x  / Bili: x / Urobili: x   Blood: x / Protein: x / Nitrite: x   Leuk Esterase: x / RBC: x / WBC x   Sq Epi: x / Non Sq Epi: x / Bacteria: x

## 2024-03-27 NOTE — PROGRESS NOTE ADULT - PROBLEM SELECTOR PLAN 7
DVT ppx: on Eliquis 5 BID   Diet: DASH  Dispo: pending, PT eval repeat after obtaining TSLO brace     Per son, multiple falls at home. Pt used to be independent and was driving up until a few months ago. However son feels patient may now need more assistance and at home and would like to consider options. Would like to speak with case management.     Patient's son Keshav Raymundo updated 3/24/24 early AM and 3/25 PM as well Hx of chronic afib on Eliquis and atenolol  - Continue home Eliquis 5mg BID  - Holding atenolol 25mg BID for now as sBP low in 100s, resume as outpatient  - CTM monitor BP and HR  - EKG reviewed showing slow Afib HR 65, qtc 480  - Monitor on telemetry on admission  - Cards Records provided through patient portal at [facility] as below:  - 2022 echo with LVEF 65-70% and also normal stress test in 2022  - F/u repeat TTE this admission for comparison and r/o cardiogenic syncope  - F/u ECG (ordered 3/27) Hx of chronic afib on Eliquis and atenolol  - Continue home Eliquis 5mg BID  - C/w atenolol 25mg BID for now   - CTM monitor BP and HR  - EKG from ED reviewed showing slow Afib HR 65, qtc 480  - Monitor on telemetry on admission  - Cards Records provided through patient portal at [facility] as below:  - 2022 echo with LVEF 65-70% and also normal stress test in 2022  - Repeat TTE 3/25 w/ mod pulm HTN; EF >75% and elevated RAP; abnormal insp collapse of IVC   - F/u ECG

## 2024-03-27 NOTE — PROGRESS NOTE ADULT - PROBLEM SELECTOR PLAN 6
Hx of chronic afib on Eliquis and atenolol  - Continue home Eliquis 5mg BID  - Holding atenolol 25mg BID for now as sBP low in 100s, resume as outpatient  - CTM monitor BP and HR  - EKG reviewed showing slow Afib HR 65, qtc 480  - Monitor on telemetry on admission  - Cards Records provided through patient portal at [facility] as below:  - 2022 echo with LVEF 65-70% and also normal stress test in 2022  - F/u repeat TTE this admission for comparison and r/o cardiogenic syncope  - F/u ECG (ordered 3/27) Mildly elevated CK likely due to prolonged time on floor following fall the day prior  - Downtrending and now WNL  - S/p IVF to prevent DIONISIO, will CTM Cr  - UA as above

## 2024-03-27 NOTE — PROGRESS NOTE ADULT - ASSESSMENT
83 year old female with hx of Afib on Eliquis, HTN, GERD, Breast cancer in remission, anxiety, OA, and lymphedema presenting after a fall.         Pt with acute fracture of L1 anterior superior endplate. Pt admitted for further management. .      Follow up CT of thoraxic ordered by admitting team. L-1 fracture noted on admission CT.     CT head no acute findings, CT neck no fracture.  Ortho following.     Continue home meds: Eliquis on hold, A.fib stable, Atenolol on hold.       Continue home Zoloft 50 mg/day.     PT eval, pain control.     No active infections.      Await TLSO brace, repeat CBC in AM, incentive spirometry.  83 year old female with hx of Afib on Eliquis, HTN, GERD, Breast cancer in remission, anxiety, OA, and lymphedema presenting after a fall.         Pt with acute fracture of L1 anterior superior endplate. Pt admitted for further management. .      Follow up CT of thoraxic ordered by admitting team. L-1 fracture noted on admission CT. CT thorax no fractures.     CT head no acute findings, CT neck no fracture.  Ortho following.     Continue home meds: Eliquis on hold, A.fib stable, resuem Atenolol 25 mg/day.        Continue home Zoloft 50 mg/day.     PT eval, pain control.     No active infections.      Await TLSO brace, CBC acceptable 8.3 to 9.5 HGB, iron studies suggest iron deficiency.     Would favor resume Eliquis at home dose, discharge to rehab once TLSO brace in place.

## 2024-03-27 NOTE — PROGRESS NOTE ADULT - PROBLEM SELECTOR PLAN 1
Likely due to recent fall. Pt with point tenderness in the lower back, but without any other alarm symptoms currently.  - CT findings reviewed showing acute appearing fracture of anterior superior endplate of L1.  - Ortho spine consulted - No acute intervention needed, pt to be placed in TLSO brace when out of bed  - Pain control as needed with tylenol and lidocaine patch  - PT evaluation when pt more stable/able to tolerate  - Fall precautions   - Will hold off MR imaging given absence of neurologic deficits or findings. Pt also with L knee arthroplasty and unsure of MRI compatibility Likely due to recent fall. Pt with point tenderness in the lower back, but without any other alarm symptoms currently.  - CT findings reviewed showing acute appearing fracture of anterior superior endplate of L1.  - Ortho spine consulted - No acute intervention needed, pt to be placed in TLSO brace when out of bed  - Pain control as needed with tylenol and lidocaine patch  - Fall precautions   - Will hold off MR imaging given absence of neurologic deficits or findings. Pt also with L knee arthroplasty and unsure of MRI compatibility  - PT evaluation with Brace on 3/27 and f/u recs

## 2024-03-27 NOTE — PROGRESS NOTE ADULT - PROBLEM SELECTOR PLAN 8
DVT ppx: holding Eliquis 5 BID for now 2/2 anemia    Diet: DASH  Dispo: pending, PT eval repeat after obtaining TSLO brace     Per son, multiple falls at home. Pt used to be independent and was driving up until a few months ago. However son feels patient may now need more assistance and at home and would like to consider options. Would like to speak with case management.     Patient's son Keshav Raymundo updated 3/24/24 early AM and 3/25 PM as well, unable to reach 3/26, plan to update in coming days DVT ppx: Restarted Eliquis 5 BID for now after stable Hgb on repeat CBC 3/27 AM     Diet: DASH  Dispo: pending, PT eval repeat after obtaining TSLO brace on 3/27; Likely needs LISET 2+ weeks    - Per son, multiple falls at home. Pt used to be independent and was driving up until a few months ago. However, son feels patient may now need more assistance and at home and would like to consider options. Ongoing discussions with CM.    - Patient's son Keshav Raymundo updated daily.

## 2024-03-27 NOTE — PROGRESS NOTE ADULT - PROBLEM SELECTOR PLAN 4
Slightly low K 3.4 likely from medication HCTZ.  - C/w K+ repletion as needed   - Hold HCTZ for now - Pt with gradually decreasing Hgb, now 8.1 on admission  - Most likely due to chronic hematuria 2/2 renal stones as above +/- dilutional iso IVF overnight, but f/u CT AP w/wo to r/o RP bleed  - Iron studies --> [facility]ASE    - F/u CBC in AM stable, 8.3 (8.1 prior)   - Mainatin active type and screen (ordered for 3/27)  - CT w/wo to further evaluate stone burden and RP bleed  --> Prelim no bleed, pending formal read

## 2024-03-27 NOTE — PROGRESS NOTE ADULT - PROBLEM SELECTOR PLAN 3
- Pt with gradually decreasing Hgb, now 8.1 on admission  - Most likely due to chronic hematuria 2/2 renal stones as above +/- dilutional iso IVF overnight, but f/u CT AP w/wo to r/o RP bleed  - F/u iron studies in AM  - F/u CBC in AM  - Mainatin active type and screen (ordered for 3/27) - CT incidentally noted 8mm L pelvic renal stone with mild fullness and adjacent periureteral stranding. Patient not endorsing any urinary symptoms currently.   - Chronic issue per pt and family at bedside and report intermittent hematuria this admission is also a chronic issue as below  - UA & UCx ordered   - Monitor urine output and SCr  - If poor urine output, can check bladder scan  - Monitor off antibiotics for now  - Follows with Dr. Muhammad outpatient (per patient, has had prior discussions  regarding stenting, but would be futile and not good surgical candidate)  - F/u Recs from urology as above  - CT w/wo with L stone, r renal cyst and nonobstructive stones - CT incidentally noted 8mm L pelvic renal stone with mild fullness and adjacent periureteral stranding. Patient not endorsing any urinary symptoms currently.   - Chronic issue per pt and family at bedside and report intermittent hematuria this admission is also a chronic issue as below  - UA & UCx ordered   - Monitor urine output and SCr  - If poor urine output, can check bladder scan  - Monitor off antibiotics for now  - Follows with Dr. Muhammad outpatient (per patient, has had prior discussions  regarding stenting, but would be futile and not good surgical candidate)  - Appreciate Recs from urology as above  - CT w/wo with L stone, r renal cyst and nonobstructive stones

## 2024-03-27 NOTE — PROGRESS NOTE ADULT - SUBJECTIVE AND OBJECTIVE BOX
PROGRESS NOTE:   Authored by Dr. Gatito Vargas MD (PGY-1)    Patient is a 83y old  Female who presents with a chief complaint of Fall (26 Mar 2024 10:27)      SUBJECTIVE / OVERNIGHT EVENTS:  No acute events overnight.       MEDICATIONS  (STANDING):  chlorhexidine 2% Cloths 1 Application(s) Topical daily  lidocaine   4% Patch 1 Patch Transdermal daily  pantoprazole    Tablet 40 milliGRAM(s) Oral before breakfast  piperacillin/tazobactam IVPB.. 3.375 Gram(s) IV Intermittent every 8 hours  polyethylene glycol 3350 17 Gram(s) Oral two times a day  sertraline 50 milliGRAM(s) Oral daily    MEDICATIONS  (PRN):  acetaminophen     Tablet .. 975 milliGRAM(s) Oral every 6 hours PRN Temp greater or equal to 38C (100.4F), Moderate Pain (4 - 6)  diazepam    Tablet 2.5 milliGRAM(s) Oral daily PRN anxiety      CAPILLARY BLOOD GLUCOSE        I&O's Summary    26 Mar 2024 07:01  -  27 Mar 2024 07:00  --------------------------------------------------------  IN: 240 mL / OUT: 1050 mL / NET: -810 mL        PHYSICAL EXAM:  Vital Signs Last 24 Hrs  T(C): 36.8 (27 Mar 2024 05:15), Max: 37.5 (26 Mar 2024 21:16)  T(F): 98.3 (27 Mar 2024 05:15), Max: 99.5 (26 Mar 2024 21:16)  HR: 111 (27 Mar 2024 05:15) (95 - 111)  BP: 114/72 (27 Mar 2024 05:15) (113/69 - 114/72)  BP(mean): --  RR: 18 (27 Mar 2024 05:15) (18 - 18)  SpO2: 96% (27 Mar 2024 05:15) (96% - 96%)    Parameters below as of 27 Mar 2024 05:15  Patient On (Oxygen Delivery Method): nasal cannula  O2 Flow (L/min): 2      CONSTITUTIONAL: NAD, well-developed  HEET: MMM, EOMI, PERRLA  NECK: supple  RESPIRATORY: Normal respiratory effort; lungs are clear to auscultation bilaterally  CARDIOVASCULAR: Regular rate and rhythm, normal S1 and S2, no murmur/rub/gallop; No lower extremity edema; Peripheral pulses are 2+ bilaterally  ABDOMEN: Nontender to palpation, normoactive bowel sounds, no rebound/guarding; No hepatosplenomegaly  MUSCULOSKELETAL: no clubbing or cyanosis of digits; no joint swelling or tenderness to palpation  NEURO: Moving all four extremities, sensation grossly intact  PSYCH: A+O to person, place, and time; affect appropriate  SKIN: No rash    LABS:                        8.3    7.19  )-----------( 280      ( 27 Mar 2024 05:23 )             28.4     03-27    141  |  104  |  10  ----------------------------<  148<H>  3.9   |  26  |  0.71    Ca    8.5      27 Mar 2024 05:23  Phos  2.8     03-27  Mg     2.0     03-27    TPro  6.1  /  Alb  3.0<L>  /  TBili  0.6  /  DBili  x   /  AST  11  /  ALT  6<L>  /  AlkPhos  101  03-27      CARDIAC MARKERS ( 26 Mar 2024 06:40 )  x     / x     / 140 U/L / x     / x          Urinalysis Basic - ( 27 Mar 2024 05:23 )    Color: x / Appearance: x / SG: x / pH: x  Gluc: 148 mg/dL / Ketone: x  / Bili: x / Urobili: x   Blood: x / Protein: x / Nitrite: x   Leuk Esterase: x / RBC: x / WBC x   Sq Epi: x / Non Sq Epi: x / Bacteria: x          Tele Reviewed:    RADIOLOGY & ADDITIONAL TESTS:  Results Reviewed:   Imaging Personally Reviewed:  Electrocardiogram Personally Reviewed:     PROGRESS NOTE:   Authored by Dr. Gatito Vargas MD (PGY-1)    Patient is a 83y old  Female who presents with a chief complaint of Fall (26 Mar 2024 10:27)      SUBJECTIVE / OVERNIGHT EVENTS:  No acute events overnight. Pt denies any CP/SOB/Palpitations. Does endorse mild burning with urination, but reports minimal change from baseline. Denies other complaints/concerns at this time. Encouraged to use incentive spirometry.       MEDICATIONS  (STANDING):  chlorhexidine 2% Cloths 1 Application(s) Topical daily  lidocaine   4% Patch 1 Patch Transdermal daily  pantoprazole    Tablet 40 milliGRAM(s) Oral before breakfast  piperacillin/tazobactam IVPB.. 3.375 Gram(s) IV Intermittent every 8 hours  polyethylene glycol 3350 17 Gram(s) Oral two times a day  sertraline 50 milliGRAM(s) Oral daily    MEDICATIONS  (PRN):  acetaminophen     Tablet .. 975 milliGRAM(s) Oral every 6 hours PRN Temp greater or equal to 38C (100.4F), Moderate Pain (4 - 6)  diazepam    Tablet 2.5 milliGRAM(s) Oral daily PRN anxiety      CAPILLARY BLOOD GLUCOSE        I&O's Summary    26 Mar 2024 07:01  -  27 Mar 2024 07:00  --------------------------------------------------------  IN: 240 mL / OUT: 1050 mL / NET: -810 mL        PHYSICAL EXAM:  Vital Signs Last 24 Hrs  T(C): 36.8 (27 Mar 2024 05:15), Max: 37.5 (26 Mar 2024 21:16)  T(F): 98.3 (27 Mar 2024 05:15), Max: 99.5 (26 Mar 2024 21:16)  HR: 111 (27 Mar 2024 05:15) (95 - 111)  BP: 114/72 (27 Mar 2024 05:15) (113/69 - 114/72)  BP(mean): --  RR: 18 (27 Mar 2024 05:15) (18 - 18)  SpO2: 96% (27 Mar 2024 05:15) (96% - 96%)    Parameters below as of 27 Mar 2024 05:15  Patient On (Oxygen Delivery Method): nasal cannula  O2 Flow (L/min): 2      CONSTITUTIONAL: Well-groomed, in no apparent distress  EYES: No conjunctival or scleral injection, EOM grossly intact   ENMT: MMM  RESPIRATORY: Breathing comfortably; lungs CTA without wheeze/rhonchi/rales appreciated bl  CARDIOVASCULAR: +S1S2, Irreg irreg rhythm, no M/G/R; 1+ lower extremity edema, equal  GASTROINTESTINAL: No palpable masses or tenderness, +BS throughout, no rebound/guarding; no hepatosplenomegaly; no hernia palpated  : No flank ttp. Blood tinged urine in collection container. No lucina hematuria   MUSCULOSKELETAL: No digital clubbing or cyanosis; no cervical tenderness; low back tenderness on palpation; no malalignment of extremities  SKIN: small laceration in R lateral forehead with hemostasis, no active bleeding. LUE chronic lymphedema, unchanged from prior exams  NEUROLOGIC: CN grossly intact; sensation intact in LEs b/l to light touch; normal strength and tone in UE/LE bl  PSYCHIATRIC: A+O x 3; mood and affect appropriate; appropriate insight and judgment     LABS:                        8.3    7.19  )-----------( 280      ( 27 Mar 2024 05:23 )             28.4     03-27    141  |  104  |  10  ----------------------------<  148<H>  3.9   |  26  |  0.71    Ca    8.5      27 Mar 2024 05:23  Phos  2.8     03-27  Mg     2.0     03-27    TPro  6.1  /  Alb  3.0<L>  /  TBili  0.6  /  DBili  x   /  AST  11  /  ALT  6<L>  /  AlkPhos  101  03-27      CARDIAC MARKERS ( 26 Mar 2024 06:40 )  x     / x     / 140 U/L / x     / x          Urinalysis Basic - ( 27 Mar 2024 05:23 )    Color: x / Appearance: x / SG: x / pH: x  Gluc: 148 mg/dL / Ketone: x  / Bili: x / Urobili: x   Blood: x / Protein: x / Nitrite: x   Leuk Esterase: x / RBC: x / WBC x   Sq Epi: x / Non Sq Epi: x / Bacteria: x          Tele Reviewed:    RADIOLOGY & ADDITIONAL TESTS:  Results Reviewed:   Imaging Personally Reviewed:  Electrocardiogram Personally Reviewed:

## 2024-03-27 NOTE — PROGRESS NOTE ADULT - PROBLEM SELECTOR PLAN 5
Mildly elevated CK likely due to prolonged time on floor following fall the day prior  - Downtrending and now WNL  - S/p IVF to prevent DIONISIO, will CTM Cr  - F/u UA as above Slightly low K 3.4 likely from medication HCTZ.  - C/w K+ repletion as needed   - Hold HCTZ for now Slightly low K 3.4 likely from medication HCTZ.  - C/w K+ repletion as needed   - Hold HCTZ for now; can discuss resuming on outpt f/u

## 2024-03-27 NOTE — PROGRESS NOTE ADULT - PROBLEM SELECTOR PLAN 2
- CT incidentally noted 8mm L pelvic renal stone with mild fullness and adjacent periureteral stranding. Patient not endorsing any urinary symptoms currently.   - Chronic issue per pt and family at bedside and report intermittent hematuria this admission is also a chronic issue as below  - UA & UCx ordered   - Monitor urine output and SCr  - If poor urine output, can check bladder scan  - Monitor off antibiotics for now  - Follows with Dr. Muhammad outpatient (per patient, has had prior discussions --> stenting would be futile and would require surgical intervention but decided to monitor nonop due to risk>benefit)  - F/u CT w/wo to further evaluate stone burden and RP bleed - UA + pyuria, LE/nitrite  - Pending UCx   - Started on Zosyn 3/26-  - F/u Urology Consult/recs - UA + pyuria, LE/nitrite, Lg blood, neg casts  - Pending UCx   - Started on Zosyn 3/26-  - Outpatient Urology Consulted and discussed over phone  --> Agree with Tx with ABx course, no plan for renal stone mgmt at this time, but rec f/u in 1-2 week in office after dc

## 2024-03-28 LAB
ALBUMIN SERPL ELPH-MCNC: 3.1 G/DL — LOW (ref 3.3–5)
ALP SERPL-CCNC: 98 U/L — SIGNIFICANT CHANGE UP (ref 40–120)
ALT FLD-CCNC: 6 U/L — LOW (ref 10–45)
ANION GAP SERPL CALC-SCNC: 10 MMOL/L — SIGNIFICANT CHANGE UP (ref 5–17)
AST SERPL-CCNC: 10 U/L — SIGNIFICANT CHANGE UP (ref 10–40)
BASOPHILS # BLD AUTO: 0.04 K/UL — SIGNIFICANT CHANGE UP (ref 0–0.2)
BASOPHILS NFR BLD AUTO: 0.6 % — SIGNIFICANT CHANGE UP (ref 0–2)
BILIRUB SERPL-MCNC: 0.6 MG/DL — SIGNIFICANT CHANGE UP (ref 0.2–1.2)
BUN SERPL-MCNC: 10 MG/DL — SIGNIFICANT CHANGE UP (ref 7–23)
CALCIUM SERPL-MCNC: 8.6 MG/DL — SIGNIFICANT CHANGE UP (ref 8.4–10.5)
CHLORIDE SERPL-SCNC: 104 MMOL/L — SIGNIFICANT CHANGE UP (ref 96–108)
CO2 SERPL-SCNC: 27 MMOL/L — SIGNIFICANT CHANGE UP (ref 22–31)
CREAT SERPL-MCNC: 0.72 MG/DL — SIGNIFICANT CHANGE UP (ref 0.5–1.3)
EGFR: 83 ML/MIN/1.73M2 — SIGNIFICANT CHANGE UP
EOSINOPHIL # BLD AUTO: 0.24 K/UL — SIGNIFICANT CHANGE UP (ref 0–0.5)
EOSINOPHIL NFR BLD AUTO: 3.7 % — SIGNIFICANT CHANGE UP (ref 0–6)
GLUCOSE SERPL-MCNC: 123 MG/DL — HIGH (ref 70–99)
HCT VFR BLD CALC: 28.9 % — LOW (ref 34.5–45)
HGB BLD-MCNC: 8.7 G/DL — LOW (ref 11.5–15.5)
IMM GRANULOCYTES NFR BLD AUTO: 0.6 % — SIGNIFICANT CHANGE UP (ref 0–0.9)
LYMPHOCYTES # BLD AUTO: 1.04 K/UL — SIGNIFICANT CHANGE UP (ref 1–3.3)
LYMPHOCYTES # BLD AUTO: 16 % — SIGNIFICANT CHANGE UP (ref 13–44)
MAGNESIUM SERPL-MCNC: 2 MG/DL — SIGNIFICANT CHANGE UP (ref 1.6–2.6)
MCHC RBC-ENTMCNC: 23.4 PG — LOW (ref 27–34)
MCHC RBC-ENTMCNC: 30.1 GM/DL — LOW (ref 32–36)
MCV RBC AUTO: 77.7 FL — LOW (ref 80–100)
MONOCYTES # BLD AUTO: 0.53 K/UL — SIGNIFICANT CHANGE UP (ref 0–0.9)
MONOCYTES NFR BLD AUTO: 8.2 % — SIGNIFICANT CHANGE UP (ref 2–14)
NEUTROPHILS # BLD AUTO: 4.61 K/UL — SIGNIFICANT CHANGE UP (ref 1.8–7.4)
NEUTROPHILS NFR BLD AUTO: 70.9 % — SIGNIFICANT CHANGE UP (ref 43–77)
NRBC # BLD: 0 /100 WBCS — SIGNIFICANT CHANGE UP (ref 0–0)
NT-PROBNP SERPL-SCNC: 540 PG/ML — HIGH (ref 0–300)
PHOSPHATE SERPL-MCNC: 3 MG/DL — SIGNIFICANT CHANGE UP (ref 2.5–4.5)
PLATELET # BLD AUTO: 289 K/UL — SIGNIFICANT CHANGE UP (ref 150–400)
POTASSIUM SERPL-MCNC: 3.9 MMOL/L — SIGNIFICANT CHANGE UP (ref 3.5–5.3)
POTASSIUM SERPL-SCNC: 3.9 MMOL/L — SIGNIFICANT CHANGE UP (ref 3.5–5.3)
PROT SERPL-MCNC: 6.4 G/DL — SIGNIFICANT CHANGE UP (ref 6–8.3)
RBC # BLD: 3.72 M/UL — LOW (ref 3.8–5.2)
RBC # FLD: 17.1 % — HIGH (ref 10.3–14.5)
SODIUM SERPL-SCNC: 141 MMOL/L — SIGNIFICANT CHANGE UP (ref 135–145)
WBC # BLD: 6.5 K/UL — SIGNIFICANT CHANGE UP (ref 3.8–10.5)
WBC # FLD AUTO: 6.5 K/UL — SIGNIFICANT CHANGE UP (ref 3.8–10.5)

## 2024-03-28 RX ORDER — CEFTRIAXONE 500 MG/1
1000 INJECTION, POWDER, FOR SOLUTION INTRAMUSCULAR; INTRAVENOUS EVERY 24 HOURS
Refills: 0 | Status: DISCONTINUED | OUTPATIENT
Start: 2024-03-28 | End: 2024-03-29

## 2024-03-28 RX ADMIN — PIPERACILLIN AND TAZOBACTAM 25 GRAM(S): 4; .5 INJECTION, POWDER, LYOPHILIZED, FOR SOLUTION INTRAVENOUS at 06:04

## 2024-03-28 RX ADMIN — CHLORHEXIDINE GLUCONATE 1 APPLICATION(S): 213 SOLUTION TOPICAL at 11:05

## 2024-03-28 RX ADMIN — ATENOLOL 25 MILLIGRAM(S): 25 TABLET ORAL at 17:16

## 2024-03-28 RX ADMIN — ATENOLOL 25 MILLIGRAM(S): 25 TABLET ORAL at 06:04

## 2024-03-28 RX ADMIN — CEFTRIAXONE 100 MILLIGRAM(S): 500 INJECTION, POWDER, FOR SOLUTION INTRAMUSCULAR; INTRAVENOUS at 11:47

## 2024-03-28 RX ADMIN — APIXABAN 5 MILLIGRAM(S): 2.5 TABLET, FILM COATED ORAL at 17:16

## 2024-03-28 RX ADMIN — Medication 20 MILLIGRAM(S): at 06:04

## 2024-03-28 RX ADMIN — APIXABAN 5 MILLIGRAM(S): 2.5 TABLET, FILM COATED ORAL at 06:04

## 2024-03-28 RX ADMIN — Medication 325 MILLIGRAM(S): at 11:47

## 2024-03-28 RX ADMIN — POLYETHYLENE GLYCOL 3350 17 GRAM(S): 17 POWDER, FOR SOLUTION ORAL at 06:04

## 2024-03-28 RX ADMIN — SERTRALINE 50 MILLIGRAM(S): 25 TABLET, FILM COATED ORAL at 11:47

## 2024-03-28 RX ADMIN — PANTOPRAZOLE SODIUM 40 MILLIGRAM(S): 20 TABLET, DELAYED RELEASE ORAL at 06:04

## 2024-03-28 NOTE — PROGRESS NOTE ADULT - ASSESSMENT
83 year old female with hx of Afib on Eliquis, HTN, GERD, Breast cancer in remission, anxiety, OA, and lymphedema presenting after a fall.         Pt with acute fracture of L1 anterior superior endplate. Pt admitted for further management. .      Follow up CT of thoraxic ordered by admitting team. L-1 fracture noted on admission CT. CT thorax no fractures.     CT head no acute findings, CT neck no fracture.  Ortho following.     Continue home meds: Eliquis on hold, A.fib stable, resuem Atenolol 25 mg/day.        Continue home Zoloft 50 mg/day.     PT eval, pain control.     No active infections.      Await TLSO brace, CBC acceptable 8.3 to 9.5 HGB, iron studies suggest iron deficiency.     Would favor resume Eliquis at home dose, discharge to rehab once TLSO brace in place.   83 year old female with hx of Afib on Eliquis, HTN, GERD, Breast cancer in remission, anxiety, OA, and lymphedema presenting after a fall.         Pt with acute fracture of L1 anterior superior endplate. Pt admitted for further management. .      Follow up CT of thoraxic ordered by admitting team. L-1 fracture noted on admission CT. CT thorax no fractures.     CT head no acute findings, CT neck no fracture.       Continue home meds: Eliquis on hold, A.fib stable, resuem Atenolol 25 mg bid.         Continue home Zoloft 50 mg/day.     PT eval, pain control.     No active infections.      Await TLSO brace, CBC acceptable 8.3 to 9.5 HGB, iron studies suggest iron deficiency.  Added daily iron.     Needs PT eval then rehab. Discharge to rehab once TLSO brace in place.

## 2024-03-28 NOTE — PROGRESS NOTE ADULT - SUBJECTIVE AND OBJECTIVE BOX
INTERVAL HPI/OVERNIGHT EVENTS:  Pt seen and examined at bedside.     Allergies/Intolerance: codeine (Nausea)      MEDICATIONS  (STANDING):  apixaban 5 milliGRAM(s) Oral two times a day  atenolol  Tablet 25 milliGRAM(s) Oral every 12 hours  chlorhexidine 2% Cloths 1 Application(s) Topical daily  ferrous    sulfate 325 milliGRAM(s) Oral daily  furosemide    Tablet 20 milliGRAM(s) Oral daily  lidocaine   4% Patch 1 Patch Transdermal daily  pantoprazole    Tablet 40 milliGRAM(s) Oral before breakfast  piperacillin/tazobactam IVPB.. 3.375 Gram(s) IV Intermittent every 8 hours  polyethylene glycol 3350 17 Gram(s) Oral two times a day  sertraline 50 milliGRAM(s) Oral daily    MEDICATIONS  (PRN):  acetaminophen     Tablet .. 975 milliGRAM(s) Oral every 6 hours PRN Temp greater or equal to 38C (100.4F), Moderate Pain (4 - 6)  diazepam    Tablet 2.5 milliGRAM(s) Oral daily PRN anxiety        ROS: all systems reviewed and wnl      PHYSICAL EXAMINATION:  Vital Signs Last 24 Hrs  T(C): 37.6 (28 Mar 2024 04:15), Max: 37.6 (28 Mar 2024 04:15)  T(F): 99.6 (28 Mar 2024 04:15), Max: 99.6 (28 Mar 2024 04:15)  HR: 119 (28 Mar 2024 04:15) (91 - 119)  BP: 110/72 (28 Mar 2024 04:15) (96/57 - 113/72)  BP(mean): --  RR: 18 (28 Mar 2024 04:15) (18 - 18)  SpO2: 94% (28 Mar 2024 07:30) (92% - 94%)    Parameters below as of 28 Mar 2024 07:30  Patient On (Oxygen Delivery Method): nasal cannula  O2 Flow (L/min): 2    CAPILLARY BLOOD GLUCOSE          03-27 @ 07:01  -  03-28 @ 07:00  --------------------------------------------------------  IN: 870 mL / OUT: 350 mL / NET: 520 mL        GENERAL: stable, fully alert, comfortable, answers all questions, full MP both LE  NECK: supple, No JVD  CHEST/LUNG: clear to auscultation bilaterally; no rales, rhonchi, or wheezing b/l  HEART: normal S1, S2  ABDOMEN: BS+, soft, ND, NT   EXTREMITIES:  pulses palpable; no clubbing, cyanosis, or edema b/l LEs    LABS:                        8.7    6.50  )-----------( 289      ( 28 Mar 2024 06:09 )             28.9     03-28    141  |  104  |  10  ----------------------------<  123<H>  3.9   |  27  |  0.72    Ca    8.6      28 Mar 2024 06:09  Phos  3.0     03-28  Mg     2.0     03-28    TPro  6.4  /  Alb  3.1<L>  /  TBili  0.6  /  DBili  x   /  AST  10  /  ALT  6<L>  /  AlkPhos  98  03-28      Urinalysis Basic - ( 28 Mar 2024 06:09 )    Color: x / Appearance: x / SG: x / pH: x  Gluc: 123 mg/dL / Ketone: x  / Bili: x / Urobili: x   Blood: x / Protein: x / Nitrite: x   Leuk Esterase: x / RBC: x / WBC x   Sq Epi: x / Non Sq Epi: x / Bacteria: x

## 2024-03-29 LAB
-  AMOXICILLIN/CLAVULANIC ACID: SIGNIFICANT CHANGE UP
-  AMPICILLIN/SULBACTAM: SIGNIFICANT CHANGE UP
-  AMPICILLIN: SIGNIFICANT CHANGE UP
-  AZTREONAM: SIGNIFICANT CHANGE UP
-  CEFAZOLIN: SIGNIFICANT CHANGE UP
-  CEFEPIME: SIGNIFICANT CHANGE UP
-  CEFTRIAXONE: SIGNIFICANT CHANGE UP
-  CEFUROXIME: SIGNIFICANT CHANGE UP
-  CIPROFLOXACIN: SIGNIFICANT CHANGE UP
-  ERTAPENEM: SIGNIFICANT CHANGE UP
-  GENTAMICIN: SIGNIFICANT CHANGE UP
-  IMIPENEM: SIGNIFICANT CHANGE UP
-  LEVOFLOXACIN: SIGNIFICANT CHANGE UP
-  MEROPENEM: SIGNIFICANT CHANGE UP
-  NITROFURANTOIN: SIGNIFICANT CHANGE UP
-  PIPERACILLIN/TAZOBACTAM: SIGNIFICANT CHANGE UP
-  TOBRAMYCIN: SIGNIFICANT CHANGE UP
-  TRIMETHOPRIM/SULFAMETHOXAZOLE: SIGNIFICANT CHANGE UP
CULTURE RESULTS: ABNORMAL
HCT VFR BLD CALC: 30.5 % — LOW (ref 34.5–45)
HGB BLD-MCNC: 9.1 G/DL — LOW (ref 11.5–15.5)
MCHC RBC-ENTMCNC: 23.3 PG — LOW (ref 27–34)
MCHC RBC-ENTMCNC: 29.8 GM/DL — LOW (ref 32–36)
MCV RBC AUTO: 78.2 FL — LOW (ref 80–100)
METHOD TYPE: SIGNIFICANT CHANGE UP
NRBC # BLD: 0 /100 WBCS — SIGNIFICANT CHANGE UP (ref 0–0)
ORGANISM # SPEC MICROSCOPIC CNT: ABNORMAL
ORGANISM # SPEC MICROSCOPIC CNT: ABNORMAL
PLATELET # BLD AUTO: 337 K/UL — SIGNIFICANT CHANGE UP (ref 150–400)
RBC # BLD: 3.9 M/UL — SIGNIFICANT CHANGE UP (ref 3.8–5.2)
RBC # FLD: 17.1 % — HIGH (ref 10.3–14.5)
SARS-COV-2 RNA SPEC QL NAA+PROBE: SIGNIFICANT CHANGE UP
SPECIMEN SOURCE: SIGNIFICANT CHANGE UP
WBC # BLD: 7.75 K/UL — SIGNIFICANT CHANGE UP (ref 3.8–10.5)
WBC # FLD AUTO: 7.75 K/UL — SIGNIFICANT CHANGE UP (ref 3.8–10.5)

## 2024-03-29 RX ORDER — ERTAPENEM SODIUM 1 G/1
1000 INJECTION, POWDER, LYOPHILIZED, FOR SOLUTION INTRAMUSCULAR; INTRAVENOUS EVERY 24 HOURS
Refills: 0 | Status: DISCONTINUED | OUTPATIENT
Start: 2024-03-29 | End: 2024-03-30

## 2024-03-29 RX ORDER — LACTOBACILLUS ACIDOPHILUS 100MM CELL
1 CAPSULE ORAL
Refills: 0 | Status: DISCONTINUED | OUTPATIENT
Start: 2024-03-29 | End: 2024-03-30

## 2024-03-29 RX ADMIN — Medication 20 MILLIGRAM(S): at 05:43

## 2024-03-29 RX ADMIN — ERTAPENEM SODIUM 120 MILLIGRAM(S): 1 INJECTION, POWDER, LYOPHILIZED, FOR SOLUTION INTRAMUSCULAR; INTRAVENOUS at 11:40

## 2024-03-29 RX ADMIN — ATENOLOL 25 MILLIGRAM(S): 25 TABLET ORAL at 05:43

## 2024-03-29 RX ADMIN — ATENOLOL 25 MILLIGRAM(S): 25 TABLET ORAL at 17:27

## 2024-03-29 RX ADMIN — APIXABAN 5 MILLIGRAM(S): 2.5 TABLET, FILM COATED ORAL at 05:43

## 2024-03-29 RX ADMIN — APIXABAN 5 MILLIGRAM(S): 2.5 TABLET, FILM COATED ORAL at 17:27

## 2024-03-29 RX ADMIN — Medication 1 TABLET(S): at 17:27

## 2024-03-29 RX ADMIN — CHLORHEXIDINE GLUCONATE 1 APPLICATION(S): 213 SOLUTION TOPICAL at 11:43

## 2024-03-29 RX ADMIN — SERTRALINE 50 MILLIGRAM(S): 25 TABLET, FILM COATED ORAL at 11:42

## 2024-03-29 RX ADMIN — Medication 325 MILLIGRAM(S): at 11:42

## 2024-03-29 RX ADMIN — PANTOPRAZOLE SODIUM 40 MILLIGRAM(S): 20 TABLET, DELAYED RELEASE ORAL at 05:43

## 2024-03-29 NOTE — CONSULT NOTE ADULT - SUBJECTIVE AND OBJECTIVE BOX
HPI  83yFemale w/ PMH of HTN, GERD, Migraine c/o LBP for 24 hours aft 2 mechanical falls over the last 24 hours. First fall happened yesterday where she was unable to get up for 14 hours, until her house keeper helped her up. Second fall was this evening, witnessed by family member, patient's legs gave out and patient hit her head on the side of furniture. Denies focal weakness, numbness/tingling, or radicular sxs. Denies fevers/chills, acute changes in bowel/bladder function, or saddle anesthesia.   ROS  Negative unless otherwise specified in HPI.    PAST MEDICAL & SURGICAL Hx  PAST MEDICAL & SURGICAL HISTORY:  HTN (hypertension), benign      GERD (gastroesophageal reflux disease)      Migraine      Breast cancer  Right 2000 Radiation  Left 2004  Radiation      Anxiety      Lymph edema  Left      S/P tonsillectomy      S/P cholecystectomy  LSO 1991      S/P lumpectomy of breast  Bilateral with Left Axillary node Dissection          MEDICATIONS  Home Medications:  atenolol 25 mg oral tablet: 1 tab(s) orally 2 times a day (24 Mar 2024 01:24)  Eliquis 5 mg oral tablet: 1 tab(s) orally 2 times a day (24 Mar 2024 01:24)  hydroCHLOROthiazide 25 mg oral tablet: 1 tab(s) orally once a day (24 Mar 2024 01:24)  metFORMIN 500 mg oral tablet: 1 tab(s) orally once a day (24 Mar 2024 01:24)  sertraline 50 mg oral tablet: 1 tab(s) orally once a day (24 Mar 2024 01:24)  Valium 5 mg oral tablet: 0.5 tab(s) orally once a day as needed for  anxiety (24 Mar 2024 01:24)      ALLERGIES  codeine (Nausea)      FAMILY Hx  FAMILY HISTORY:      SOCIAL Hx  Social History:      VITALS  Vital Signs Last 24 Hrs  T(C): 36.4 (24 Mar 2024 01:17), Max: 36.6 (23 Mar 2024 18:46)  T(F): 97.5 (24 Mar 2024 01:17), Max: 97.8 (23 Mar 2024 18:46)  HR: 68 (24 Mar 2024 01:17) (66 - 69)  BP: 108/68 (24 Mar 2024 01:17) (108/68 - 128/58)  BP(mean): 77 (23 Mar 2024 21:57) (76 - 77)  RR: 18 (24 Mar 2024 01:17) (16 - 20)  SpO2: 97% (24 Mar 2024 01:17) (97% - 99%)    Parameters below as of 24 Mar 2024 01:17  Patient On (Oxygen Delivery Method): nasal cannula  O2 Flow (L/min): 2      PHYSICAL EXAM  Gen: Lying in bed, NAD  Resp: No increased WOB  Spine:  Skin intact  No bony TTP or step-offs along c-, t-, l-spine, or sacrum    Motor:                   C5                C6              C7               C8           T1   R            5/5                5/5            5/5             5/5          5/5  L             5/5               5/5             5/5             5/5          5/5                L2             L3             L4               L5            S1  R         5/5           5/5          5/5             5/5           5/5  L          5/5          5/5           5/5             5/5           5/5    Sensory:            C5         C6         C7      C8       T1        (0=absent, 1=impaired, 2=normal, NT=not testable)  R         2            2           2        2         2  L          2            2           2        2         2               L2          L3         L4      L5       S1         (0=absent, 1=impaired, 2=normal, NT=not testable)  R         2            2            2        2        2  L          2            2           2        2         2    (-) Rivera test b/l  (-) Straight leg raise test b/l  (-) Babinski sign b/l  (-) Ankle clonus b/l  Gait grossly unremarkable    LABS                        9.8    10.84 )-----------( 352      ( 23 Mar 2024 19:21 )             33.1     03-23    140  |  100  |  19  ----------------------------<  139<H>  3.4<L>   |  22  |  0.87    Ca    9.7      23 Mar 2024 19:21    TPro  7.2  /  Alb  3.6  /  TBili  0.8  /  DBili  x   /  AST  17  /  ALT  7<L>  /  AlkPhos  93  03-23    PT/INR - ( 23 Mar 2024 19:21 )   PT: 29.9 sec;   INR: 2.80 ratio         PTT - ( 23 Mar 2024 19:21 )  PTT:39.7 sec    IMAGING  CT Lspine:   Acute appearing fracture of the anterior superior endplate of L1   involving the anterior bridging osteophyte and posteriorly the right   pedicle into the superior facet. No associated facet widening or   significant prevertebral body height loss. Age-indeterminate fracture   right anterior bridging marginal osteophyte L2/L3 .    
Urology Consult Note    Chief Complaint: fall      History of Present Illness: 83 year old female with hx of Afib on Eliquis, HTN, GERD, Breast cancer in remission, anxiety, OA, and lymphedema presenting after a fall. Pt with acute fracture of L1 anterior superior endplate. patient is also being treated for a UTI. Culture is pending and wbc and cr are normal. Patient has a 1cm renal stone on CT with mild fullness. she reports she has known about this stone for many years. She has no flank pain      PAST MEDICAL & SURGICAL HISTORY:  HTN (hypertension), benign      GERD (gastroesophageal reflux disease)      Migraine      Breast cancer  Right 2000 Radiation  Left 2004  Radiation      Anxiety      Lymph edema  Left      S/P tonsillectomy      S/P cholecystectomy  LSO 1991      S/P lumpectomy of breast  Bilateral with Left Axillary node Dissection          FAMILY HISTORY:      Allergies    codeine (Nausea)    Intolerances        Social History:  Denies tobacco or alcohol use    Review of Systems:   Constitutional: No weight loss, no weakness  HEENT: No visual loss, no hearing loss, no sneezing  Skin: No rash or itching  CV: No chest pain, no chest pressure  Pulm: No shortness of breath, cough, or sputum  GI: No melena, no constipation  : Per HPI  Neuro: No headache, dizziness  MSK: No muscle pain, no joint pain  Heme: No anemia, bruising, or bleeding  Lymphatics: No enlarged nodes, no history of splenectomy  Psych: No depression of anxiety  Endo: No cold or heat intolerance  Allergies: No asthma, hives    Physical Exam:  Vital signs  T(C): 37.1 (03-27-24 @ 11:25), Max: 37.5 (03-26-24 @ 21:16)  HR: 114 (03-27-24 @ 11:25)  BP: 113/72 (03-27-24 @ 11:25)  SpO2: 94% (03-27-24 @ 11:25)  Wt(kg): --    Gen: No acute distress. Normal mood  HEENT: Normocephalic, neck supple  CV: Hemodynamically stable  Pulm: No increased work of breathing  Abd: soft, non-tender, non-distended  Back: No CVA tenderness, no midline pain  Extremities: No significant deformity or joint abnormality  Neuro: Alert & oriented x3, No focal deficits  Skin: Skin normal color, normal texture  Psych: Normal affect, normal behavior  : Nonpalpable bladder no cvat      Labs:      03-27 @ 05:23    WBC 7.19  / Hct 28.4  / SCr 0.71     03-26 @ 17:31    WBC 7.99  / Hct 32.8  / SCr --       03-27    141  |  104  |  10  ----------------------------<  148<H>  3.9   |  26  |  0.71    Ca    8.5      27 Mar 2024 05:23  Phos  2.8     03-27  Mg     2.0     03-27    TPro  6.1  /  Alb  3.0<L>  /  TBili  0.6  /  DBili  x   /  AST  11  /  ALT  6<L>  /  AlkPhos  101  03-27      Urinalysis Basic - ( 27 Mar 2024 05:23 )    Color: x / Appearance: x / SG: x / pH: x  Gluc: 148 mg/dL / Ketone: x  / Bili: x / Urobili: x   Blood: x / Protein: x / Nitrite: x   Leuk Esterase: x / RBC: x / WBC x   Sq Epi: x / Non Sq Epi: x / Bacteria: x        
[facility]  Edison Handley PA-C  [address] [place] [address] [phone]      Chief Complaint:  Patient is a 83y old  Female who presents with a chief complaint of Fall (29 Mar 2024 10:06)      HPI: 83 year old female with hx of Afib on Eliquis, HTN, GERD, Breast cancer in remission, anxiety, OA, and lymphedema presenting after a fall. Patient's son at bedside who provided additional collateral. Patient has been having several falls at home lately. She currently lives alone but has a  that comes occasionally. She has been independent and walks unassisted most of the time. Over the last few months, son has felt that his mother's functional status has declined. She has been having more falls, appears to lose balance more frequently, and has some cognitive changes. Son witnessed the patient fall yesterday afternoon when he came to visit her at her home. Pt suddenly fell forward and landed on her shoulder and hit her R head. No LOC. Pt attributed the event from her lack of sleep lately. She is endorsing some lower back pain but currently without any other symptoms. Pt also had a fall the day prior and was unable to get back up due to her bad knees. She stayed on the floor for a few hours but was able to crawl around until help came. Pt denied any chest pain, abdominal pain, urinary retention, nausea, vomiting, dizziness or lightheadedness. Has some urinary incontinence which she says is not new. Also some dyspnea on exertion. She currently receives outpatient physical therapy twice a week. Per son, pt was hypoxic when EMS arrived and was placed on nasal cannula. Pt does not usually use oxygen at home.     GI asked to consult for anemia. Pt denies melena or hematochezia. No hx GI bleeding in past. Her last colonoscopy was "years ago but told it was normal". Of note she does have hematuria. Iron studies noted.     Allergies:  codeine (Nausea)      Medications:  acetaminophen     Tablet .. 975 milliGRAM(s) Oral every 6 hours PRN  apixaban 5 milliGRAM(s) Oral two times a day  atenolol  Tablet 25 milliGRAM(s) Oral every 12 hours  chlorhexidine 2% Cloths 1 Application(s) Topical daily  ertapenem  IVPB 1000 milliGRAM(s) IV Intermittent every 24 hours  ferrous    sulfate 325 milliGRAM(s) Oral daily  furosemide    Tablet 20 milliGRAM(s) Oral daily  lactobacillus acidophilus 1 Tablet(s) Oral two times a day with meals  lidocaine   4% Patch 1 Patch Transdermal daily  pantoprazole    Tablet 40 milliGRAM(s) Oral before breakfast  polyethylene glycol 3350 17 Gram(s) Oral two times a day  sertraline 50 milliGRAM(s) Oral daily      PMHX/PSHX:  HTN (hypertension), benign    GERD (gastroesophageal reflux disease)    Migraine    Breast cancer    Anxiety    Lymph edema    S/P tonsillectomy    S/P cholecystectomy    S/P lumpectomy of breast        Family history:      Social History:     ROS:     General:  No wt loss, fevers, chills, night sweats, fatigue,   Eyes:  Good vision, no reported pain  ENT:  No sore throat, pain, runny nose, dysphagia  CV:  No pain, palpitations, hypo/hypertension  Resp:  No dyspnea, cough, tachypnea, wheezing  GI:  No pain, No nausea, No vomiting, No diarrhea, No constipation, No weight loss, No fever, No pruritis, No rectal bleeding, No tarry stools, No dysphagia,  :  No pain, + bleeding, incontinence, nocturia  Muscle:  No pain, weakness  Neuro:  No weakness, tingling, memory problems  Psych:  No fatigue, insomnia, mood problems, depression  Endocrine:  No polyuria, polydipsia, cold/heat intolerance  Heme:  No petechiae, ecchymosis, easy bruisability  Skin:  No rash, tattoos, scars, edema      PHYSICAL EXAM:   Vital Signs:  Vital Signs Last 24 Hrs  T(C): 36.9 (29 Mar 2024 11:25), Max: 37 (28 Mar 2024 12:06)  T(F): 98.4 (29 Mar 2024 11:25), Max: 98.6 (28 Mar 2024 12:06)  HR: 87 (29 Mar 2024 11:25) (74 - 99)  BP: 98/66 (29 Mar 2024 11:25) (90/58 - 121/77)  BP(mean): --  RR: 18 (29 Mar 2024 11:27) (18 - 18)  SpO2: 94% (29 Mar 2024 11:27) (85% - 98%)    Parameters below as of 29 Mar 2024 11:27  Patient On (Oxygen Delivery Method): nasal cannula  O2 Flow (L/min): 2    Daily     Daily     GENERAL:  Appears stated age,   HEENT:  NC/AT,    CHEST:  Full & symmetric excursion,   HEART:  Regular rhythm  ABDOMEN:  Soft, non-tender, non-distended,   EXTEREMITIES:  no cyanosis,clubbing or edema  SKIN:  No rash  NEURO:  Alert,    LABS:                        9.1    7.75  )-----------( 337      ( 29 Mar 2024 11:08 )             30.5     03-28    141  |  104  |  10  ----------------------------<  123<H>  3.9   |  27  |  0.72    Ca    8.6      28 Mar 2024 06:09  Phos  3.0     03-28  Mg     2.0     03-28    TPro  6.4  /  Alb  3.1<L>  /  TBili  0.6  /  DBili  x   /  AST  10  /  ALT  6<L>  /  AlkPhos  98  03-28    LIVER FUNCTIONS - ( 28 Mar 2024 06:09 )  Alb: 3.1 g/dL / Pro: 6.4 g/dL / ALK PHOS: 98 U/L / ALT: 6 U/L / AST: 10 U/L / GGT: x             Urinalysis Basic - ( 28 Mar 2024 06:09 )    Color: x / Appearance: x / SG: x / pH: x  Gluc: 123 mg/dL / Ketone: x  / Bili: x / Urobili: x   Blood: x / Protein: x / Nitrite: x   Leuk Esterase: x / RBC: x / WBC x   Sq Epi: x / Non Sq Epi: x / Bacteria: x          Imaging:

## 2024-03-29 NOTE — CONSULT NOTE ADULT - ASSESSMENT
anemia   hematuria     cbc daily, hgb has been stable   suspect 2/2 hematuria  doubt gi blood loss  no overt bleeding  iron studies noted   reg diet as tolerated  no objection to ac   no plans for endoscopic w/u, pt would not want either   dc planning per primary   d/w pt
83 year old female with hx of Afib on Eliquis, HTN, GERD, Breast cancer, anxiety, OA, and lymphedema presenting after a fall. found to have acute fracture of L1 anterior superior endplatte.  consulted for UTI and renal stone  - continue abx, await culture result  - no intervention for renal stone - she has no hydro or flank pain. Discussed that given stone size she should strongly consider surgical intervention with ESWL or Ureteroscopy  - should follow up in  office 2 weeks post discharge for discussion of these options    Willi Hardy MD  [facility]  [address] [place] [address] [phone]
ASSESSMENT & PLAN  83yFemale w/ L1 superior endplate fracture.  -WBAT  -TLSO when out of bed  -pain control  -incentive spirometry  -PT/OT  -no acute ortho spine surgery at this time  -f/u outpt with Dr. Buchanan in 1 week, please call office for appt    Will Discuss with Dr. Buchanan and update accordingly    For all questions related to patient care, please reach out to the on-call team via the pager.     Fatou Almendarez, PGY 2  Orthopaedic Surgery  [facility] [id number]  [facility] [id number]  [facility] [phone]

## 2024-03-29 NOTE — PROGRESS NOTE ADULT - ASSESSMENT
83 year old female with hx of Afib on Eliquis, HTN, GERD, Breast cancer in remission, anxiety, OA, and lymphedema presenting after a fall.         Pt with acute fracture of L1 anterior superior endplate. Pt admitted for further management. .      Follow up CT of thoraxic ordered by admitting team. L-1 fracture noted on admission CT. CT thorax no fractures.     CT head no acute findings, CT neck no fracture.       Continue home meds: Eliquis on hold, A.fib stable, resuem Atenolol 25 mg bid.         Continue home Zoloft 50 mg/day.     PT eval, pain control.     No active infections.      Await TLSO brace, CBC acceptable 8.3 to 9.5 HGB, iron studies suggest iron deficiency.  Added daily iron.     Needs PT eval then rehab. Discharge to rehab once TLSO brace in place.   83 year old female with hx of Afib on Eliquis, HTN, GERD, Breast cancer in remission, anxiety, OA, and lymphedema presenting after a fall.         Pt with acute fracture of L1 anterior superior endplate. Pt admitted for further management. .      Follow up CT of thoraxic ordered by admitting team. L-1 fracture noted on admission CT. CT thorax no fractures.     CT head no acute findings, CT neck no fracture.       Continue home meds: Eliquis 5 mg bid, A.fib stable, resuem Atenolol 25 mg bid.         Continue home Zoloft 50 mg/day.     PT eval, pain control.     No active infections.      Await TLSO brace, CBC acceptable 8.3 to 9.5 HGB, iron studies suggest iron deficiency.  Added daily iron. Occult blood pending.     Needs PT eval then rehab. Discharge to rehab once TLSO brace in place.

## 2024-03-29 NOTE — PROGRESS NOTE ADULT - SUBJECTIVE AND OBJECTIVE BOX
INTERVAL HPI/OVERNIGHT EVENTS:  Pt seen and examined at bedside.     Allergies/Intolerance: codeine (Nausea)      MEDICATIONS  (STANDING):  apixaban 5 milliGRAM(s) Oral two times a day  atenolol  Tablet 25 milliGRAM(s) Oral every 12 hours  cefTRIAXone   IVPB 1000 milliGRAM(s) IV Intermittent every 24 hours  chlorhexidine 2% Cloths 1 Application(s) Topical daily  ferrous    sulfate 325 milliGRAM(s) Oral daily  furosemide    Tablet 20 milliGRAM(s) Oral daily  lidocaine   4% Patch 1 Patch Transdermal daily  pantoprazole    Tablet 40 milliGRAM(s) Oral before breakfast  polyethylene glycol 3350 17 Gram(s) Oral two times a day  sertraline 50 milliGRAM(s) Oral daily    MEDICATIONS  (PRN):  acetaminophen     Tablet .. 975 milliGRAM(s) Oral every 6 hours PRN Temp greater or equal to 38C (100.4F), Moderate Pain (4 - 6)  diazepam    Tablet 2.5 milliGRAM(s) Oral daily PRN anxiety        ROS: all systems reviewed and wnl      PHYSICAL EXAMINATION:  Vital Signs Last 24 Hrs  T(C): 36.6 (29 Mar 2024 04:16), Max: 37 (28 Mar 2024 12:06)  T(F): 97.9 (29 Mar 2024 04:16), Max: 98.6 (28 Mar 2024 12:06)  HR: 79 (29 Mar 2024 04:16) (74 - 99)  BP: 121/77 (29 Mar 2024 04:16) (90/58 - 121/77)  BP(mean): --  RR: 18 (29 Mar 2024 04:16) (18 - 18)  SpO2: 95% (29 Mar 2024 04:16) (93% - 98%)    Parameters below as of 29 Mar 2024 04:16  Patient On (Oxygen Delivery Method): nasal cannula  O2 Flow (L/min): 2    CAPILLARY BLOOD GLUCOSE          03-28 @ 07:01  -  03-29 @ 07:00  --------------------------------------------------------  IN: 800 mL / OUT: 350 mL / NET: 450 mL        GENERAL: stable, in bed, comfortable, no melena or abdominal pain.   NECK: supple, No JVD  CHEST/LUNG: clear to auscultation bilaterally; no rales, rhonchi, or wheezing b/l  HEART: normal S1, S2  ABDOMEN: BS+, soft, ND, NT   EXTREMITIES:  pulses palpable; no clubbing, cyanosis, or edema b/l LEs    LABS:                        8.7    6.50  )-----------( 289      ( 28 Mar 2024 06:09 )             28.9     03-28    141  |  104  |  10  ----------------------------<  123<H>  3.9   |  27  |  0.72    Ca    8.6      28 Mar 2024 06:09  Phos  3.0     03-28  Mg     2.0     03-28    TPro  6.4  /  Alb  3.1<L>  /  TBili  0.6  /  DBili  x   /  AST  10  /  ALT  6<L>  /  AlkPhos  98  03-28      Urinalysis Basic - ( 28 Mar 2024 06:09 )    Color: x / Appearance: x / SG: x / pH: x  Gluc: 123 mg/dL / Ketone: x  / Bili: x / Urobili: x   Blood: x / Protein: x / Nitrite: x   Leuk Esterase: x / RBC: x / WBC x   Sq Epi: x / Non Sq Epi: x / Bacteria: x

## 2024-03-30 ENCOUNTER — TRANSCRIPTION ENCOUNTER (OUTPATIENT)
Age: 84
End: 2024-03-30

## 2024-03-30 VITALS
OXYGEN SATURATION: 95 % | DIASTOLIC BLOOD PRESSURE: 60 MMHG | HEART RATE: 86 BPM | SYSTOLIC BLOOD PRESSURE: 100 MMHG | RESPIRATION RATE: 18 BRPM | TEMPERATURE: 98 F

## 2024-03-30 LAB
BLD GP AB SCN SERPL QL: NEGATIVE — SIGNIFICANT CHANGE UP
HCT VFR BLD CALC: 28.6 % — LOW (ref 34.5–45)
HGB BLD-MCNC: 8.4 G/DL — LOW (ref 11.5–15.5)
MCHC RBC-ENTMCNC: 23 PG — LOW (ref 27–34)
MCHC RBC-ENTMCNC: 29.4 GM/DL — LOW (ref 32–36)
MCV RBC AUTO: 78.4 FL — LOW (ref 80–100)
NRBC # BLD: 0 /100 WBCS — SIGNIFICANT CHANGE UP (ref 0–0)
PLATELET # BLD AUTO: 327 K/UL — SIGNIFICANT CHANGE UP (ref 150–400)
RBC # BLD: 3.65 M/UL — LOW (ref 3.8–5.2)
RBC # FLD: 17.2 % — HIGH (ref 10.3–14.5)
RH IG SCN BLD-IMP: POSITIVE — SIGNIFICANT CHANGE UP
WBC # BLD: 7.18 K/UL — SIGNIFICANT CHANGE UP (ref 3.8–10.5)
WBC # FLD AUTO: 7.18 K/UL — SIGNIFICANT CHANGE UP (ref 3.8–10.5)

## 2024-03-30 PROCEDURE — 90471 IMMUNIZATION ADMIN: CPT

## 2024-03-30 PROCEDURE — 85025 COMPLETE CBC W/AUTO DIFF WBC: CPT

## 2024-03-30 PROCEDURE — 72128 CT CHEST SPINE W/O DYE: CPT | Mod: MC

## 2024-03-30 PROCEDURE — 73030 X-RAY EXAM OF SHOULDER: CPT

## 2024-03-30 PROCEDURE — 87635 SARS-COV-2 COVID-19 AMP PRB: CPT

## 2024-03-30 PROCEDURE — 85027 COMPLETE CBC AUTOMATED: CPT

## 2024-03-30 PROCEDURE — 86901 BLOOD TYPING SEROLOGIC RH(D): CPT

## 2024-03-30 PROCEDURE — 93005 ELECTROCARDIOGRAM TRACING: CPT

## 2024-03-30 PROCEDURE — 85045 AUTOMATED RETICULOCYTE COUNT: CPT

## 2024-03-30 PROCEDURE — 83880 ASSAY OF NATRIURETIC PEPTIDE: CPT

## 2024-03-30 PROCEDURE — 97161 PT EVAL LOW COMPLEX 20 MIN: CPT

## 2024-03-30 PROCEDURE — 73564 X-RAY EXAM KNEE 4 OR MORE: CPT

## 2024-03-30 PROCEDURE — 83550 IRON BINDING TEST: CPT

## 2024-03-30 PROCEDURE — 36415 COLL VENOUS BLD VENIPUNCTURE: CPT

## 2024-03-30 PROCEDURE — 82553 CREATINE MB FRACTION: CPT

## 2024-03-30 PROCEDURE — 99285 EMERGENCY DEPT VISIT HI MDM: CPT

## 2024-03-30 PROCEDURE — 82550 ASSAY OF CK (CPK): CPT

## 2024-03-30 PROCEDURE — 74177 CT ABD & PELVIS W/CONTRAST: CPT | Mod: MC

## 2024-03-30 PROCEDURE — 85730 THROMBOPLASTIN TIME PARTIAL: CPT

## 2024-03-30 PROCEDURE — 84100 ASSAY OF PHOSPHORUS: CPT

## 2024-03-30 PROCEDURE — C8929: CPT

## 2024-03-30 PROCEDURE — 85610 PROTHROMBIN TIME: CPT

## 2024-03-30 PROCEDURE — 87641 MR-STAPH DNA AMP PROBE: CPT

## 2024-03-30 PROCEDURE — 80053 COMPREHEN METABOLIC PANEL: CPT

## 2024-03-30 PROCEDURE — 97162 PT EVAL MOD COMPLEX 30 MIN: CPT

## 2024-03-30 PROCEDURE — 72131 CT LUMBAR SPINE W/O DYE: CPT | Mod: MC

## 2024-03-30 PROCEDURE — 80048 BASIC METABOLIC PNL TOTAL CA: CPT

## 2024-03-30 PROCEDURE — 70450 CT HEAD/BRAIN W/O DYE: CPT | Mod: MC

## 2024-03-30 PROCEDURE — 81001 URINALYSIS AUTO W/SCOPE: CPT

## 2024-03-30 PROCEDURE — 84484 ASSAY OF TROPONIN QUANT: CPT

## 2024-03-30 PROCEDURE — 87640 STAPH A DNA AMP PROBE: CPT

## 2024-03-30 PROCEDURE — 97530 THERAPEUTIC ACTIVITIES: CPT

## 2024-03-30 PROCEDURE — 87086 URINE CULTURE/COLONY COUNT: CPT

## 2024-03-30 PROCEDURE — 86850 RBC ANTIBODY SCREEN: CPT

## 2024-03-30 PROCEDURE — 71045 X-RAY EXAM CHEST 1 VIEW: CPT

## 2024-03-30 PROCEDURE — 82728 ASSAY OF FERRITIN: CPT

## 2024-03-30 PROCEDURE — 96374 THER/PROPH/DIAG INJ IV PUSH: CPT

## 2024-03-30 PROCEDURE — 86900 BLOOD TYPING SEROLOGIC ABO: CPT

## 2024-03-30 PROCEDURE — 83735 ASSAY OF MAGNESIUM: CPT

## 2024-03-30 PROCEDURE — 83540 ASSAY OF IRON: CPT

## 2024-03-30 PROCEDURE — 87186 SC STD MICRODIL/AGAR DIL: CPT

## 2024-03-30 PROCEDURE — 72125 CT NECK SPINE W/O DYE: CPT | Mod: MC

## 2024-03-30 PROCEDURE — 90715 TDAP VACCINE 7 YRS/> IM: CPT

## 2024-03-30 RX ORDER — MELOXICAM 15 MG/1
1 TABLET ORAL
Refills: 0 | DISCHARGE

## 2024-03-30 RX ORDER — ERTAPENEM SODIUM 1 G/1
1 INJECTION, POWDER, LYOPHILIZED, FOR SOLUTION INTRAMUSCULAR; INTRAVENOUS
Qty: 0 | Refills: 0 | DISCHARGE
Start: 2024-03-30

## 2024-03-30 RX ORDER — ACETAMINOPHEN 500 MG
3 TABLET ORAL
Qty: 0 | Refills: 0 | DISCHARGE
Start: 2024-03-30

## 2024-03-30 RX ORDER — FUROSEMIDE 40 MG
1 TABLET ORAL
Qty: 0 | Refills: 0 | DISCHARGE
Start: 2024-03-30

## 2024-03-30 RX ORDER — FERROUS SULFATE 325(65) MG
1 TABLET ORAL
Qty: 0 | Refills: 0 | DISCHARGE
Start: 2024-03-30

## 2024-03-30 RX ORDER — POLYETHYLENE GLYCOL 3350 17 G/17G
17 POWDER, FOR SOLUTION ORAL
Qty: 0 | Refills: 0 | DISCHARGE
Start: 2024-03-30

## 2024-03-30 RX ORDER — LIDOCAINE 4 G/100G
1 CREAM TOPICAL
Qty: 0 | Refills: 0 | DISCHARGE
Start: 2024-03-30

## 2024-03-30 RX ORDER — HYDROCHLOROTHIAZIDE 25 MG
1 TABLET ORAL
Refills: 0 | DISCHARGE

## 2024-03-30 RX ORDER — PANTOPRAZOLE SODIUM 20 MG/1
1 TABLET, DELAYED RELEASE ORAL
Qty: 0 | Refills: 0 | DISCHARGE
Start: 2024-03-30

## 2024-03-30 RX ADMIN — ATENOLOL 25 MILLIGRAM(S): 25 TABLET ORAL at 06:16

## 2024-03-30 RX ADMIN — PANTOPRAZOLE SODIUM 40 MILLIGRAM(S): 20 TABLET, DELAYED RELEASE ORAL at 06:15

## 2024-03-30 RX ADMIN — APIXABAN 5 MILLIGRAM(S): 2.5 TABLET, FILM COATED ORAL at 06:15

## 2024-03-30 RX ADMIN — Medication 20 MILLIGRAM(S): at 06:15

## 2024-03-30 RX ADMIN — ERTAPENEM SODIUM 120 MILLIGRAM(S): 1 INJECTION, POWDER, LYOPHILIZED, FOR SOLUTION INTRAMUSCULAR; INTRAVENOUS at 11:53

## 2024-03-30 RX ADMIN — SERTRALINE 50 MILLIGRAM(S): 25 TABLET, FILM COATED ORAL at 11:54

## 2024-03-30 RX ADMIN — Medication 325 MILLIGRAM(S): at 11:54

## 2024-03-30 RX ADMIN — CHLORHEXIDINE GLUCONATE 1 APPLICATION(S): 213 SOLUTION TOPICAL at 12:03

## 2024-03-30 RX ADMIN — Medication 1 TABLET(S): at 08:48

## 2024-03-30 NOTE — PROGRESS NOTE ADULT - ASSESSMENT
anemia   hematuria     cbc daily, hgb has been stable   suspect 2/2 hematuria  doubt gi blood loss  no overt bleeding  iron studies noted   reg diet as tolerated  no objection to ac   no plans for endoscopic w/u, pt would not want either   dc planning per primary   d/w pt

## 2024-03-30 NOTE — PROGRESS NOTE ADULT - ASSESSMENT
83 year old female with hx of Afib on Eliquis, HTN, GERD, Breast cancer in remission, anxiety, OA, and lymphedema presenting after a fall.         Pt with acute fracture of L1 anterior superior endplate. Pt admitted for further management. .      Follow up CT of thoraxic ordered by admitting team. L-1 fracture noted on admission CT. CT thorax no fractures.     CT head no acute findings, CT neck no fracture.       Continue home meds: Eliquis 5 mg bid, A.fib stable, resuem Atenolol 25 mg bid.         Continue home Zoloft 50 mg/day.     PT eval, pain control.     No active infections.      Await TLSO brace, CBC acceptable 8.3 to 9.5 HGB, iron studies suggest iron deficiency.  Added daily iron. Occult blood pending.     Needs PT eval then rehab. Discharge to rehab once TLSO brace in place.   83 year old female with hx of Afib on Eliquis, HTN, GERD, Breast cancer in remission, anxiety, OA, and lymphedema presenting after a fall.         Pt with acute fracture of L1 anterior superior endplate. Pt admitted for further management. .      Follow up CT of thoraxic ordered by admitting team. L-1 fracture noted on admission CT. CT thorax no fractures.     CT head no acute findings, CT neck no fracture.       Continue home meds: Eliquis 5 mg bid, A.fib stable, resuem Atenolol 25 mg bid.   Added low dose Lasix 20 mg daily.       Continue home Zoloft 50 mg/day.     PT eval, pain control.     No active infections.      Await TLSO brace, CBC acceptable 8.3 to 9.5 HGB, iron studies suggest iron deficiency.  Added daily iron. Occult blood pending.     GI agree with outpatient GI eval. Needs discharge to rehab. Discharge to rehab once TLSO brace in place.   83 year old female with hx of Afib on Eliquis, HTN, GERD, Breast cancer in remission, anxiety, OA, and lymphedema presenting after a fall.         Pt with acute fracture of L1 anterior superior endplate. Pt admitted for further management. .      Follow up CT of thoraxic ordered by admitting team. L-1 fracture noted on admission CT. CT thorax no fractures.     CT head no acute findings, CT neck no fracture.       Continue home meds: Eliquis 5 mg bid, A.fib stable, resuem Atenolol 25 mg bid.   Added low dose Lasix 20 mg daily.       Continue home Zoloft 50 mg/day.     PT eval, pain control.     No active infections.      Await TLSO brace, CBC acceptable 8.3 to 9.5 HGB, iron studies suggest iron deficiency.  Added daily iron. Occult blood pending.     Repeat CXR is improved, BNP normal range, does not support CHF, likely lower lobe atelectasis.     GI agree with outpatient GI eval. Needs discharge to rehab. Discharge to rehab once TLSO brace in place.

## 2024-03-30 NOTE — DISCHARGE NOTE NURSING/CASE MANAGEMENT/SOCIAL WORK - NSDCVIVACCINE_GEN_ALL_CORE_FT
Tdap; 23-Mar-2024 21:47; Brian Barksdale (RN); [employer]; [id number] (Exp. Date: 20-Jul-2025); IntraMuscular; Deltoid Right.; 0.5 milliLiter(s); VIS (VIS Published: 09-May-2013, VIS Presented: 23-Mar-2024);

## 2024-03-30 NOTE — PROGRESS NOTE ADULT - SUBJECTIVE AND OBJECTIVE BOX
INTERVAL HPI/OVERNIGHT EVENTS:  Pt seen and examined at bedside.     Allergies/Intolerance: codeine (Nausea)      MEDICATIONS  (STANDING):  apixaban 5 milliGRAM(s) Oral two times a day  atenolol  Tablet 25 milliGRAM(s) Oral every 12 hours  chlorhexidine 2% Cloths 1 Application(s) Topical daily  ertapenem  IVPB 1000 milliGRAM(s) IV Intermittent every 24 hours  ferrous    sulfate 325 milliGRAM(s) Oral daily  furosemide    Tablet 20 milliGRAM(s) Oral daily  lactobacillus acidophilus 1 Tablet(s) Oral two times a day with meals  lidocaine   4% Patch 1 Patch Transdermal daily  pantoprazole    Tablet 40 milliGRAM(s) Oral before breakfast  polyethylene glycol 3350 17 Gram(s) Oral two times a day  sertraline 50 milliGRAM(s) Oral daily    MEDICATIONS  (PRN):  acetaminophen     Tablet .. 975 milliGRAM(s) Oral every 6 hours PRN Temp greater or equal to 38C (100.4F), Moderate Pain (4 - 6)        ROS: all systems reviewed and wnl      PHYSICAL EXAMINATION:  Vital Signs Last 24 Hrs  T(C): 36.6 (30 Mar 2024 05:06), Max: 37 (29 Mar 2024 20:38)  T(F): 97.9 (30 Mar 2024 05:06), Max: 98.6 (29 Mar 2024 20:38)  HR: 89 (30 Mar 2024 05:06) (87 - 99)  BP: 112/71 (30 Mar 2024 05:06) (98/66 - 112/71)  BP(mean): --  RR: 18 (30 Mar 2024 05:06) (18 - 18)  SpO2: 94% (30 Mar 2024 05:06) (85% - 96%)    Parameters below as of 30 Mar 2024 05:06  Patient On (Oxygen Delivery Method): nasal cannula  O2 Flow (L/min): 2    CAPILLARY BLOOD GLUCOSE          03-29 @ 07:01  -  03-30 @ 07:00  --------------------------------------------------------  IN: 480 mL / OUT: 275 mL / NET: 205 mL        GENERAL: stable, tolerated TLSO brace well, no fevers or hematuria  NECK: supple, No JVD  CHEST/LUNG: clear to auscultation bilaterally; no rales, rhonchi, or wheezing b/l  HEART: normal S1, S2  ABDOMEN: BS+, soft, ND, NT   EXTREMITIES:  pulses palpable; no clubbing, cyanosis, or edema b/l LEs    LABS:                        8.4    7.18  )-----------( 327      ( 30 Mar 2024 06:07 )             28.6

## 2024-03-30 NOTE — PROGRESS NOTE ADULT - PROVIDER SPECIALTY LIST ADULT
Gastroenterology
Hospitalist
Internal Medicine

## 2024-03-30 NOTE — DISCHARGE NOTE NURSING/CASE MANAGEMENT/SOCIAL WORK - PATIENT PORTAL LINK FT
You can access the FollowMyHealth Patient Portal offered by [facility] by registering at the following website: [email or web address]. By joining "[email or web address]"’s FollowMyHealth portal, you will also be able to view your health information using other applications (apps) compatible with our system.

## 2024-03-30 NOTE — PROGRESS NOTE ADULT - SUBJECTIVE AND OBJECTIVE BOX
[facility]  Edison Handley PA-C  [address] [place], [place] [id number] [phone]      INTERVAL HPI/OVERNIGHT EVENTS:  Seen and examined   h/h noted   no s/s GIIB   offers no complaints       MEDICATIONS  (STANDING):  apixaban 5 milliGRAM(s) Oral two times a day  atenolol  Tablet 25 milliGRAM(s) Oral every 12 hours  chlorhexidine 2% Cloths 1 Application(s) Topical daily  ertapenem  IVPB 1000 milliGRAM(s) IV Intermittent every 24 hours  ferrous    sulfate 325 milliGRAM(s) Oral daily  furosemide    Tablet 20 milliGRAM(s) Oral daily  lactobacillus acidophilus 1 Tablet(s) Oral two times a day with meals  lidocaine   4% Patch 1 Patch Transdermal daily  pantoprazole    Tablet 40 milliGRAM(s) Oral before breakfast  polyethylene glycol 3350 17 Gram(s) Oral two times a day  sertraline 50 milliGRAM(s) Oral daily    MEDICATIONS  (PRN):  acetaminophen     Tablet .. 975 milliGRAM(s) Oral every 6 hours PRN Temp greater or equal to 38C (100.4F), Moderate Pain (4 - 6)      Allergies    codeine (Nausea)    Intolerances            PHYSICAL EXAM:   Vital Signs:  Vital Signs Last 24 Hrs  T(C): 36.6 (30 Mar 2024 05:06), Max: 37 (29 Mar 2024 20:38)  T(F): 97.9 (30 Mar 2024 05:06), Max: 98.6 (29 Mar 2024 20:38)  HR: 89 (30 Mar 2024 05:06) (87 - 99)  BP: 112/71 (30 Mar 2024 05:06) (98/66 - 112/71)  BP(mean): --  RR: 18 (30 Mar 2024 05:06) (18 - 18)  SpO2: 94% (30 Mar 2024 05:06) (85% - 96%)    Parameters below as of 30 Mar 2024 05:06  Patient On (Oxygen Delivery Method): nasal cannula  O2 Flow (L/min): 2    Daily     Daily         GENERAL:  Appears stated age  HEENT:  NC/AT  CHEST:  Full & symmetric excursion  HEART:  Regular rhythm  ABDOMEN:  Soft, non tender non distended   EXTEREMITIES:  no cyanosis  SKIN:  No rash  NEURO:  Alert          LABS:                        8.4    7.18  )-----------( 327      ( 30 Mar 2024 06:07 )             28.6                 RADIOLOGY & ADDITIONAL TESTS:

## 2024-03-30 NOTE — DISCHARGE NOTE NURSING/CASE MANAGEMENT/SOCIAL WORK - NSDCFUADDAPPT_GEN_ALL_CORE_FT
APPTS ARE READY TO BE MADE: [x] YES    Best Family or Patient Contact (if needed):    Additional Information about above appointments (if needed):    1: PCP  2: Orthopedics - Dr. Buchanan in 1 week, please call office for appt  3: Cards (has appt w/ [facility] provider)   4: Urology 1-2 weeks (Dr. Muhammad or [facility] [phone])  5. GI    Other comments or requests:

## 2024-04-08 ENCOUNTER — APPOINTMENT (OUTPATIENT)
Dept: ORTHOPEDIC SURGERY | Facility: CLINIC | Age: 84
End: 2024-04-08

## 2024-04-15 ENCOUNTER — APPOINTMENT (OUTPATIENT)
Dept: ORTHOPEDIC SURGERY | Facility: CLINIC | Age: 84
End: 2024-04-15